# Patient Record
Sex: MALE | Race: WHITE
[De-identification: names, ages, dates, MRNs, and addresses within clinical notes are randomized per-mention and may not be internally consistent; named-entity substitution may affect disease eponyms.]

---

## 2017-01-20 ENCOUNTER — HOSPITAL ENCOUNTER (INPATIENT)
Dept: HOSPITAL 17 - PHED | Age: 77
LOS: 4 days | Discharge: HOME | DRG: 269 | End: 2017-01-24
Attending: SURGERY | Admitting: SURGERY
Payer: MEDICARE

## 2017-01-20 VITALS
DIASTOLIC BLOOD PRESSURE: 114 MMHG | OXYGEN SATURATION: 98 % | HEART RATE: 84 BPM | RESPIRATION RATE: 14 BRPM | SYSTOLIC BLOOD PRESSURE: 140 MMHG

## 2017-01-20 VITALS
SYSTOLIC BLOOD PRESSURE: 111 MMHG | OXYGEN SATURATION: 98 % | RESPIRATION RATE: 18 BRPM | HEART RATE: 75 BPM | DIASTOLIC BLOOD PRESSURE: 64 MMHG

## 2017-01-20 VITALS
TEMPERATURE: 97.8 F | RESPIRATION RATE: 18 BRPM | OXYGEN SATURATION: 96 % | SYSTOLIC BLOOD PRESSURE: 115 MMHG | DIASTOLIC BLOOD PRESSURE: 80 MMHG | HEART RATE: 66 BPM

## 2017-01-20 VITALS
HEART RATE: 66 BPM | DIASTOLIC BLOOD PRESSURE: 73 MMHG | TEMPERATURE: 98 F | RESPIRATION RATE: 18 BRPM | OXYGEN SATURATION: 94 % | SYSTOLIC BLOOD PRESSURE: 115 MMHG

## 2017-01-20 VITALS — HEIGHT: 68 IN | BODY MASS INDEX: 25.13 KG/M2 | WEIGHT: 165.79 LBS

## 2017-01-20 VITALS
DIASTOLIC BLOOD PRESSURE: 98 MMHG | OXYGEN SATURATION: 98 % | SYSTOLIC BLOOD PRESSURE: 183 MMHG | HEART RATE: 79 BPM | RESPIRATION RATE: 14 BRPM

## 2017-01-20 VITALS — TEMPERATURE: 98.4 F | RESPIRATION RATE: 16 BRPM | HEART RATE: 84 BPM | OXYGEN SATURATION: 99 %

## 2017-01-20 VITALS
RESPIRATION RATE: 16 BRPM | DIASTOLIC BLOOD PRESSURE: 71 MMHG | OXYGEN SATURATION: 100 % | SYSTOLIC BLOOD PRESSURE: 116 MMHG | HEART RATE: 64 BPM

## 2017-01-20 VITALS
RESPIRATION RATE: 18 BRPM | SYSTOLIC BLOOD PRESSURE: 111 MMHG | DIASTOLIC BLOOD PRESSURE: 66 MMHG | HEART RATE: 77 BPM | OXYGEN SATURATION: 98 %

## 2017-01-20 VITALS — RESPIRATION RATE: 16 BRPM | SYSTOLIC BLOOD PRESSURE: 147 MMHG | HEART RATE: 68 BPM | DIASTOLIC BLOOD PRESSURE: 81 MMHG

## 2017-01-20 VITALS
HEART RATE: 81 BPM | RESPIRATION RATE: 18 BRPM | SYSTOLIC BLOOD PRESSURE: 174 MMHG | OXYGEN SATURATION: 99 % | DIASTOLIC BLOOD PRESSURE: 90 MMHG

## 2017-01-20 VITALS — OXYGEN SATURATION: 97 % | RESPIRATION RATE: 16 BRPM

## 2017-01-20 DIAGNOSIS — I50.9: ICD-10-CM

## 2017-01-20 DIAGNOSIS — D72.829: ICD-10-CM

## 2017-01-20 DIAGNOSIS — F17.210: ICD-10-CM

## 2017-01-20 DIAGNOSIS — I71.4: Primary | ICD-10-CM

## 2017-01-20 DIAGNOSIS — R74.8: ICD-10-CM

## 2017-01-20 DIAGNOSIS — H91.90: ICD-10-CM

## 2017-01-20 DIAGNOSIS — I11.0: ICD-10-CM

## 2017-01-20 LAB
ALP SERPL-CCNC: 100 U/L (ref 45–117)
ALT SERPL-CCNC: 9 U/L (ref 12–78)
ANION GAP SERPL CALC-SCNC: 12 MEQ/L (ref 5–15)
APTT BLD: 33.7 SEC (ref 24.3–30.1)
APTT BLD: 35 SEC (ref 24.3–30.1)
AST SERPL-CCNC: 17 U/L (ref 15–37)
BASOPHILS # BLD AUTO: 0.3 TH/MM3 (ref 0–0.2)
BASOPHILS NFR BLD: 2.4 % (ref 0–2)
BILIRUB SERPL-MCNC: 1.5 MG/DL (ref 0.2–1)
BUN SERPL-MCNC: 14 MG/DL (ref 7–18)
CHLORIDE SERPL-SCNC: 98 MEQ/L (ref 98–107)
COLOR UR: YELLOW
COMMENT (UR): (no result)
CULTURE IF INDICATED: (no result)
EOSINOPHIL # BLD: 0 TH/MM3 (ref 0–0.4)
EOSINOPHIL NFR BLD: 0.1 % (ref 0–4)
ERYTHROCYTE [DISTWIDTH] IN BLOOD BY AUTOMATED COUNT: 13.3 % (ref 11.6–17.2)
ERYTHROCYTE [DISTWIDTH] IN BLOOD BY AUTOMATED COUNT: 13.5 % (ref 11.6–17.2)
GFR SERPLBLD BASED ON 1.73 SQ M-ARVRAT: 59 ML/MIN (ref 89–?)
GLUCOSE UR STRIP-MCNC: (no result) MG/DL
HCO3 BLD-SCNC: 22.8 MEQ/L (ref 21–32)
HCT VFR BLD CALC: 38.7 % (ref 39–51)
HCT VFR BLD CALC: 39.4 % (ref 39–51)
HEMO FLAGS: (no result)
HGB UR QL STRIP: (no result)
HYALINE CASTS #/AREA URNS LPF: (no result) /LPF
INR PPP: 1.1 RATIO
INR PPP: 1.1 RATIO
KETONES UR STRIP-MCNC: (no result) MG/DL
LEUKOCYTE ESTERASE UR QL STRIP: (no result) /HPF (ref 0–5)
LYMPHOCYTES # BLD AUTO: 1.8 TH/MM3 (ref 1–4.8)
LYMPHOCYTES NFR BLD AUTO: 12.4 % (ref 9–44)
MCH RBC QN AUTO: 29 PG (ref 27–34)
MCH RBC QN AUTO: 29.4 PG (ref 27–34)
MCHC RBC AUTO-ENTMCNC: 34 % (ref 32–36)
MCHC RBC AUTO-ENTMCNC: 34 % (ref 32–36)
MCV RBC AUTO: 85.4 FL (ref 80–100)
MCV RBC AUTO: 86.4 FL (ref 80–100)
METHOD OF COLLECTION: (no result)
MONOCYTES NFR BLD: 7.7 % (ref 0–8)
NEUTROPHILS # BLD AUTO: 11.4 TH/MM3 (ref 1.8–7.7)
NEUTROPHILS NFR BLD AUTO: 77.4 % (ref 16–70)
NITRITE UR QL STRIP: (no result)
PLATELET # BLD: 181 TH/MM3 (ref 150–450)
PLATELET # BLD: 194 TH/MM3 (ref 150–450)
POTASSIUM SERPL-SCNC: 4.5 MEQ/L (ref 3.5–5.1)
PROTHROMBIN TIME: 11.7 SEC (ref 9.8–11.6)
PROTHROMBIN TIME: 12.1 SEC (ref 9.8–11.6)
RBC # BLD AUTO: 4.48 MIL/MM3 (ref 4.5–5.9)
RBC # BLD AUTO: 4.61 MIL/MM3 (ref 4.5–5.9)
RBC #/AREA URNS HPF: (no result) /HPF (ref 0–3)
REVIEW FLAG: (no result)
SODIUM SERPL-SCNC: 133 MEQ/L (ref 136–145)
SP GR UR STRIP: 1.03 (ref 1–1.03)
SQUAMOUS #/AREA URNS HPF: (no result) /HPF (ref 0–5)
WBC # BLD AUTO: 13.5 TH/MM3 (ref 4–11)
WBC # BLD AUTO: 14.6 TH/MM3 (ref 4–11)

## 2017-01-20 PROCEDURE — 85610 PROTHROMBIN TIME: CPT

## 2017-01-20 PROCEDURE — 80048 BASIC METABOLIC PNL TOTAL CA: CPT

## 2017-01-20 PROCEDURE — 83605 ASSAY OF LACTIC ACID: CPT

## 2017-01-20 PROCEDURE — 96375 TX/PRO/DX INJ NEW DRUG ADDON: CPT

## 2017-01-20 PROCEDURE — 85018 HEMOGLOBIN: CPT

## 2017-01-20 PROCEDURE — 86920 COMPATIBILITY TEST SPIN: CPT

## 2017-01-20 PROCEDURE — 71010: CPT

## 2017-01-20 PROCEDURE — 86901 BLOOD TYPING SEROLOGIC RH(D): CPT

## 2017-01-20 PROCEDURE — C1769 GUIDE WIRE: HCPCS

## 2017-01-20 PROCEDURE — C9113 INJ PANTOPRAZOLE SODIUM, VIA: HCPCS

## 2017-01-20 PROCEDURE — 87641 MR-STAPH DNA AMP PROBE: CPT

## 2017-01-20 PROCEDURE — 74177 CT ABD & PELVIS W/CONTRAST: CPT

## 2017-01-20 PROCEDURE — 93308 TTE F-UP OR LMTD: CPT

## 2017-01-20 PROCEDURE — 93005 ELECTROCARDIOGRAM TRACING: CPT

## 2017-01-20 PROCEDURE — C1725 CATH, TRANSLUMIN NON-LASER: HCPCS

## 2017-01-20 PROCEDURE — 85730 THROMBOPLASTIN TIME PARTIAL: CPT

## 2017-01-20 PROCEDURE — 86900 BLOOD TYPING SEROLOGIC ABO: CPT

## 2017-01-20 PROCEDURE — 85025 COMPLETE CBC W/AUTO DIFF WBC: CPT

## 2017-01-20 PROCEDURE — 76937 US GUIDE VASCULAR ACCESS: CPT

## 2017-01-20 PROCEDURE — 74174 CTA ABD&PLVS W/CONTRAST: CPT

## 2017-01-20 PROCEDURE — 80053 COMPREHEN METABOLIC PANEL: CPT

## 2017-01-20 PROCEDURE — 96374 THER/PROPH/DIAG INJ IV PUSH: CPT

## 2017-01-20 PROCEDURE — 75630 X-RAY AORTA LEG ARTERIES: CPT

## 2017-01-20 PROCEDURE — 84100 ASSAY OF PHOSPHORUS: CPT

## 2017-01-20 PROCEDURE — 85027 COMPLETE CBC AUTOMATED: CPT

## 2017-01-20 PROCEDURE — 85014 HEMATOCRIT: CPT

## 2017-01-20 PROCEDURE — C1874 STENT, COATED/COV W/DEL SYS: HCPCS

## 2017-01-20 PROCEDURE — 83735 ASSAY OF MAGNESIUM: CPT

## 2017-01-20 PROCEDURE — 84484 ASSAY OF TROPONIN QUANT: CPT

## 2017-01-20 PROCEDURE — 83690 ASSAY OF LIPASE: CPT

## 2017-01-20 PROCEDURE — 94664 DEMO&/EVAL PT USE INHALER: CPT

## 2017-01-20 PROCEDURE — 83880 ASSAY OF NATRIURETIC PEPTIDE: CPT

## 2017-01-20 PROCEDURE — 81001 URINALYSIS AUTO W/SCOPE: CPT

## 2017-01-20 PROCEDURE — 86850 RBC ANTIBODY SCREEN: CPT

## 2017-01-20 RX ADMIN — MORPHINE SULFATE PRN MG: 2 INJECTION, SOLUTION INTRAMUSCULAR; INTRAVENOUS at 22:59

## 2017-01-20 RX ADMIN — ESMOLOL HYDROCHLORIDE SCH MLS/HR: 10 INJECTION INTRAVENOUS at 21:27

## 2017-01-20 RX ADMIN — ESMOLOL HYDROCHLORIDE SCH MLS/HR: 10 INJECTION INTRAVENOUS at 14:28

## 2017-01-20 NOTE — HHI.HP
History of Present Illness


Chief Complaint:


AAA


History of Present Illness


75 yo male with limited medical history who presented through ED with 2 days of 

abdominal pain and loose stools and emesis x 2.  Pt underwent CT scan that 

showed intact infrarenal AAA.  His pain is mostly in the LEFT hip and radiates 

to back and lower abdominal quadrants.  Never had this before.





Past/Family/Social History


Past Medical History


No known, but pt doesn't go to [facility] much.


Past Surgical History


appy


eye surgery


Social History


+ tobacco


Family History


brother with CAD


Home Medications


Reported Medications


Docusate Sodium (Dulcolax Stool Softener)100 Mg Sck418 Mg PO DAILY PRN (

CONSTIPATION) #60 CAP  Ref 0


   1/20/17


Coded Allergies:  


     No Known Allergies (Unverified , 1/20/17)





Review of Systems


Constitutional:  DENIES: Fever, Chills, Change in appetite


Respiratory:  COMPLAINS OF: Snoring, Wheezing, Hemoptysis, Sputum production,  

DENIES: Cough, Shortness of breath


Cardiovascular:  COMPLAINS OF: Syncope,  DENIES: Chest pain, Dyspnea on Exertion

, Claudication


Gastrointestinal:  COMPLAINS OF: Abdominal pain, Black stools, Diarrhea, Nausea

, Vomiting


Musculoskeletal:  COMPLAINS OF: Muscle aches, Joint Swelling, Back pain





Physical Exam


Vitals/I&O











  Date Time  Temp Pulse Resp B/P Pulse Ox O2 Delivery O2 Flow Rate FiO2


 


1/20/17 20:24  77 18  98   


 


1/20/17 20:23  77 18 111/66 98 Room Air  


 


1/20/17 19:30  75 18 111/64 98 Room Air  


 


1/20/17 18:30  64 16 116/71 100 Room Air  


 


1/20/17 16:08  68 16 147/81    


 


1/20/17 15:05  84 14 140/114 98 Room Air  


 


1/20/17 14:16  79 14 183/98 98 Room Air  


 


1/20/17 13:00  81 18 174/90 99 Room Air  


 


1/20/17 12:30   16  97 Room Air  


 


1/20/17 12:28   16     


 


1/20/17 11:03 98.4 84 16  99   














 1/20/17 1/20/17 1/20/17





 07:00 15:00 23:00


 


Output Total   900 ml


 


Balance   -900 ml








Neuro:


alert oriented


HEENT:


NC/AT


Neck:


trachea midline, no JVD


Heart:


reg rate, rhythm


Lungs:


distant BS bilaterally


Abdomen:


soft, palpable upper abdominal mass, no peritonitis.


Lower quadrants mildly TTP equally


Vascular:


palpable femoral, popliteal pulses


Extremities:


5/5 strength





Laboratory Tests








Test 1/20/17 1/20/17 1/20/17 1/20/17





 11:41 11:57 13:39 14:40


 


White Blood Count 14.6   13.5  


 


Red Blood Count 4.61   4.48  


 


Hemoglobin 13.4   13.2  


 


Hematocrit 39.4   38.7  


 


Mean Corpuscular Volume 85.4   86.4  


 


Mean Corpuscular Hemoglobin 29.0   29.4  


 


Mean Corpuscular Hemoglobin 34.0   34.0  





Concent    


 


Red Cell Distribution Width 13.5   13.3  


 


Platelet Count 194   181  


 


Mean Platelet Volume 6.7   6.2  


 


Neutrophils (%) (Auto) 77.4    


 


Lymphocytes (%) (Auto) 12.4    


 


Monocytes (%) (Auto) 7.7    


 


Eosinophils (%) (Auto) 0.1    


 


Basophils (%) (Auto) 2.4    


 


Neutrophils # (Auto) 11.4    


 


Lymphocytes # (Auto) 1.8    


 


Monocytes # (Auto) 1.1    


 


Eosinophils # (Auto) 0.0    


 


Basophils # (Auto) 0.3    


 


CBC Comment DIFF FINAL    


 


Differential Comment     


 


Prothrombin Time 11.7   12.1  


 


Prothromb Time International 1.1   1.1  





Ratio    


 


Activated Partial 33.7   35.0  





Thromboplast Time    


 


Sodium Level 133    


 


Potassium Level 4.5    


 


Chloride Level 98    


 


Carbon Dioxide Level 22.8    


 


Anion Gap 12    


 


Blood Urea Nitrogen 14    


 


Creatinine 1.20    


 


Estimat Glomerular Filtration 59    





Rate    


 


Random Glucose 115    


 


Lactic Acid Level 2.3    


 


Calcium Level 9.3    


 


Total Bilirubin 1.5    


 


Aspartate Amino Transf 17    





(AST/SGOT)    


 


Alanine Aminotransferase 9    





(ALT/SGPT)    


 


Alkaline Phosphatase 100    


 


Troponin I 0.32    


 


B-Type Natriuretic Peptide 1172    


 


Total Protein 7.4    


 


Albumin 2.3    


 


Lipase 66    


 


Urine Collection Type  CLEAN CATCH   


 


Urine Color  YELLOW   


 


Urine Turbidity  SLIGHT   


 


Urine pH  6.5   


 


Urine Specific Gravity  1.035   


 


Urine Protein  300 OR GREATER   


 


Urine Glucose (UA)  NEG   


 


Urine Ketones  NEG   


 


Urine Occult Blood  LARGE   


 


Urine Nitrite  NEG   


 


Urine Bilirubin  NEG   


 


Urine Leukocyte Esterase  NEG   


 


Urine RBC  0-3   


 


Urine WBC  0-2   


 


Urine Squamous Epithelial  0-5   





Cells    


 


Urine Amorphous Sediment  FEW   


 


Urine Hyaline Casts  15-19   


 


Microscopic Urinalysis Comment  CULT NOT  





  INDICATED  


 


Urine Collection Time  11:57   


 


Blood Type    O POSITIVE 


 


Antibody Screen    NEGATIVE 











Last 48 hours Impressions








Abdomen/Pelvis CT 1/20/17 1127 Signed





Impressions: 





 Service Date/Time:  Friday, January 20, 2017 13:46 - CONCLUSION:  Abdominal 





 aortic aneurysm as described above.    There is no evidence for leakage.   





 Findings have been discussed with Dr. Rojas on today's date.     Janes Saab MD  FACR


 


Chest X-Ray 1/20/17 0000 Signed





Impressions: 





 Service Date/Time:  Friday, January 20, 2017 11:34 - CONCLUSION:  1. 





 Cardiomegaly and findings of congestive heart failure.     Yash Weaver MD 











Assessment and Plan


Plan


1. AAA, 6 cm and intact.  Possibly symptomatic.  I would like to repeat his CTA 

tomorrow morning after IV hydration/bicarbonate.  Likely to have repair this 

hospitalization.  I discussed the procedure with the patient and he seems to 

understand the nature of the benefits of surgery vs observation.   There is no 

evidence of extravasation and he looks well clinically.


2. CV protective meds: ASA, beta blocker, statin. 


3. Goal SBP < 130


4. The patient previously expressed a wish to be DNR but he does not want that 

at present and agrees to pre-operative planning.








Saqib Hercules MD Jan 20, 2017 22:46

## 2017-01-20 NOTE — HHI.CCPN
History


-


Height: 172.72 cm     Weight: 74 kg


Allergies:  


Coded Allergies:  


     No Known Allergies (Unverified , 1/20/17)


Major 24 Hour Events


Notified by ED/Dr. Rojas that patient being transferred from [place] for 

stable AAA.  No intervention planned at this time per ED physician.  Dr. Hercules 

is the admitting physician.  We will be available.





Exam


Patient Data


-





 Vital Signs








  Date Time  Temp Pulse Resp B/P Pulse Ox O2 Delivery O2 Flow Rate FiO2


 


1/20/17 16:08  68 16 147/81    


 


1/20/17 15:05  84 14 140/114 98 Room Air  


 


1/20/17 14:16  79 14 183/98 98 Room Air  


 


1/20/17 13:00  81 18 174/90 99 Room Air  


 


1/20/17 12:30   16  97 Room Air  


 


1/20/17 12:28   16     


 


1/20/17 11:03 98.4 84 16  99   











Results


CBC/BMP:  


1/20/17 1339                                                                   

             1/20/17 1141











Pastor Ferreira MD Jan 20, 2017 16:44

## 2017-01-20 NOTE — RADHPO
EXAM DATE/TIME:  01/20/2017 11:34 

 

[facility] COMPARISON:     

No previous studies available for comparison.

 

                     

INDICATIONS :     

Short of breath

                     

 

MEDICAL HISTORY :            

Smoker   

 

SURGICAL HISTORY :     

None.   

 

ENCOUNTER:     

Initial                                        

 

ACUITY:     

2 days      

 

PAIN SCORE:     

5/10

 

LOCATION:     

Bilateral chest 

 

FINDINGS:     

The cardiac silhouette is enlarged in transverse diameter. There are findings of congestive heart kinsey
lure with interstitial and alveolar opacity bilaterally. No pleural effusions are identified. There i
s prominence of the aortic knob is with calcification characteristic of atherosclerotic vascular dise
ase.

 

CONCLUSION:     

1. Cardiomegaly and findings of congestive heart failure.

 

 

 

 Yash Weaver MD on January 20, 2017 at 11:48           

Board Certified Radiologist.

 This report was verified electronically.

## 2017-01-20 NOTE — RADHPO
EXAM DATE/TIME:  01/20/2017 13:46 

 

[facility] COMPARISON:     

No previous studies available for comparison.

 

 

INDICATIONS:     

Left lower quadrant pain.  Bilateral flank pain.  Hematuria. 

                      

 

IV CONTRAST:     

80 cc Omnipaque 350 (iohexol) IV 

 

 

ORAL CONTRAST:      

No oral contrast ingested.

                      

 

RADIATION DOSE:     

7.16 CTDIvol (mGy) 

 

 

MEDICAL HISTORY:      

None  

 

SURGICAL HISTORY:       

Appendectomy. 

 

ENCOUNTER:      

Initial

 

ACUITY:      

2 days

 

PAIN SCALE:      

8/10

 

LOCATION:       

Left lower quadrant 

 

TECHNIQUE:     

Volumetric scanning of the abdomen and pelvis was performed.  Using automated exposure control and ad
justment of the mA and/or kV according to patient size, radiation dose was kept as low as reasonably 
achievable to obtain optimal diagnostic quality images. 

 

FINDINGS:     

Coarse interstitial changes seen in both lung bases.  

 

The liver, spleen, pancreas are unremarkable.  Adrenal glands appear normal.  There is symmetrical re
nal 

function. 

 

There is a 6.3 cm infrarenal abdominal aortic aneurysm with two separate layers of calcification in t
he wall 

suggesting previous expansion.  This extends down to the aortic bifurcation.  Both iliac arteries are
 patent.  

 

There is no retroperitoneal hematoma.  

 

There is no ascites or adenopathy appreciated. 

 

CONCLUSION:     

Abdominal aortic aneurysm as described above.  

 

There is no evidence for leakage. 

 

Findings have been discussed with Dr. Rojas on today's date. 

 

 

 Janes Saab MD FACR on January 20, 2017 at 14:14                

Board Certified Radiologist.

 This report was verified electronically.

## 2017-01-20 NOTE — PD
HPI


Chief Complaint:  GI Complaint


Time Seen by Provider:  11:16


Travel History


International Travel<30 days:  No


Contact w/Intl Traveler<30days:  No


Traveled to known affect area:  No





History of Present Illness


HPI


75yo M who denies any PMH presents to the ED with c/o abdominal pain for 2.5 

days.  States it is more left sided from back to left lower abdomen and down 

proximal femur of left leg.  Pt also states he started having right sided pain 

after.  Pt has sob with walking only a few steps.  +Nausea.  Normal bowel 

movement yesterday.  Denies any chest pain, fever, vomiting, urinary complaints

, weakness or numbness.





PFSH


Past Medical History


Diminished Hearing:  Yes


Immunizations Current:  Yes


Tetanus Vaccination:  Unknown


Influenza Vaccination:  No





Past Surgical History


Appendectomy:  Yes





Social History


Alcohol Use:  No


Tobacco Use:  Yes (1 PPD)


Substance Use:  No





Allergies-Medications


(Allergen,Severity, Reaction):  


Coded Allergies:  


     No Known Allergies (Unverified , 1/20/17)


Reported Meds & Prescriptions





Reported Meds & Active Scripts


Active


Reported


Dulcolax Stool Softener (Docusate Sodium) 100 Mg Cap 100 Mg PO DAILY PRN








Review of Systems


Except as stated in HPI:  all other systems reviewed are Neg





Physical Exam


Narrative


GENERAL: 75yo M in mild distress.


SKIN: Warm and dry.


HEAD: Atraumatic. Normocephalic. 


EYES: Pupils equal and round. No scleral icterus. No injection or drainage. 


ENT: No nasal bleeding or discharge.  Mucous membranes pink and moist.


NECK: Trachea midline. No JVD. 


CARDIOVASCULAR: Regular rate and rhythm.  No murmur appreciated.


RESPIRATORY: + accessory muscle use. Crackles bilateral lower lungs.  


GASTROINTESTINAL: Abdomen soft, mild epigastric ttp.  +TTP LLQ.  No rebound 

tenderness or guarding.


MUSCULOSKELETAL: No obvious deformities. No clubbing.  No cyanosis.  Trace 

lower ext edema. 


NEUROLOGICAL: Awake and alert. No obvious cranial nerve deficits.  Motor 

grossly within normal limits. Normal speech.


PSYCHIATRIC: Appropriate mood and affect; insight and judgment normal.





Data


Data


Last Documented VS





Vital Signs








  Date Time  Temp Pulse Resp B/P Pulse Ox O2 Delivery O2 Flow Rate FiO2


 


1/20/17 14:16  79 14 183/98 98 Room Air  


 


1/20/17 11:03 98.4       








Orders





 Complete Blood Count With Diff (1/20/17 11:27)


Comprehensive Metabolic Panel (1/20/17 11:27)


Lipase (1/20/17 11:27)


Lactic Acid (1/20/17 11:27)


Prothrombin Time / Inr (Pt) (1/20/17 11:27)


Act Partial Throm Time (Ptt) (1/20/17 11:27)


Urinalysis - C+S If Indicated (1/20/17 11:27)


Ct Abd/Pel W Iv Contrast(Rout) (1/20/17 11:27)


Iv Access Insert/Monitor (1/20/17 11:27)


Ecg Monitoring (1/20/17 11:27)


Oximetry (1/20/17 11:27)


Morphine Inj (Morphine Inj) (1/20/17 11:30)


Ondansetron Inj (Zofran Inj) (1/20/17 11:30)


Pantoprazole Inj (Protonix Inj) (1/20/17 11:30)


Sodium Chloride 0.9% Flush (Ns Flush) (1/20/17 11:30)


Electrocardiogram (1/20/17 11:27)


Troponin I (1/20/17 11:29)


Chest, Single Ap (1/20/17 )


B-Type Natriuretic Peptide (1/20/17 11:29)


Furosemide Inj (Lasix Inj) (1/20/17 13:15)


Heparin Infusion KELLEY.Q1H (1/20/17 13:15)


Heparin Inj (Heparin Inj) (1/20/17 19:15)


Heparin Inj (Heparin Inj) (1/20/17 19:15)


Heparin-D5w Inj (Heparin-D5w Inj) (1/20/17 13:15)


Act Partial Throm Time (Ptt) (1/20/17 13:15)


Prothrombin Time / Inr (Pt) (1/20/17 13:15)


Cbc No Diff, Includes Plts (1/20/17 13:15)


Occult Blood (Hemoccult) Stool (1/20/17 13:15)


Iohexol 350 Inj (Omnipaque 350 Inj) (1/20/17 13:56)


Esmolol Drip Inj Premix (Brevibloc Drip (1/20/17 14:15)


Admit Order (Ed Use Only) (1/20/17 14:20)





Labs





 Laboratory Tests








Test 1/20/17 1/20/17 1/20/17





 11:41 11:57 13:39


 


White Blood Count 14.6 TH/MM3  13.5 TH/MM3


 


Red Blood Count 4.61 MIL/MM3  4.48 MIL/MM3


 


Hemoglobin 13.4 GM/DL  13.2 GM/DL


 


Hematocrit 39.4 %  38.7 %


 


Mean Corpuscular Volume 85.4 FL  86.4 FL


 


Mean Corpuscular Hemoglobin 29.0 PG  29.4 PG


 


Mean Corpuscular Hemoglobin 34.0 %  34.0 %





Concent   


 


Red Cell Distribution Width 13.5 %  13.3 %


 


Platelet Count 194 TH/MM3  181 TH/MM3


 


Mean Platelet Volume 6.7 FL  6.2 FL


 


Neutrophils (%) (Auto) 77.4 %  


 


Lymphocytes (%) (Auto) 12.4 %  


 


Monocytes (%) (Auto) 7.7 %  


 


Eosinophils (%) (Auto) 0.1 %  


 


Basophils (%) (Auto) 2.4 %  


 


Neutrophils # (Auto) 11.4 TH/MM3  


 


Lymphocytes # (Auto) 1.8 TH/MM3  


 


Monocytes # (Auto) 1.1 TH/MM3  


 


Eosinophils # (Auto) 0.0 TH/MM3  


 


Basophils # (Auto) 0.3 TH/MM3  


 


CBC Comment DIFF FINAL   


 


Differential Comment    


 


Prothrombin Time 11.7 SEC  12.1 SEC


 


Prothromb Time International 1.1 RATIO  1.1 RATIO





Ratio   


 


Activated Partial 33.7 SEC  35.0 SEC





Thromboplast Time   


 


Sodium Level 133 MEQ/L  


 


Potassium Level 4.5 MEQ/L  


 


Chloride Level 98 MEQ/L  


 


Carbon Dioxide Level 22.8 MEQ/L  


 


Anion Gap 12 MEQ/L  


 


Blood Urea Nitrogen 14 MG/DL  


 


Creatinine 1.20 MG/DL  


 


Estimat Glomerular Filtration 59 ML/MIN  





Rate   


 


Random Glucose 115 MG/DL  


 


Lactic Acid Level 2.3 mmol/L  


 


Calcium Level 9.3 MG/DL  


 


Total Bilirubin 1.5 MG/DL  


 


Aspartate Amino Transf 17 U/L  





(AST/SGOT)   


 


Alanine Aminotransferase 9 U/L  





(ALT/SGPT)   


 


Alkaline Phosphatase 100 U/L  


 


Troponin I 0.32 NG/ML  


 


B-Type Natriuretic Peptide 1172 PG/ML  


 


Total Protein 7.4 GM/DL  


 


Albumin 2.3 GM/DL  


 


Lipase 66 U/L  


 


Urine Collection Type  CLEAN CATCH  


 


Urine Color  YELLOW  


 


Urine Turbidity  SLIGHT  


 


Urine pH  6.5  


 


Urine Specific Gravity  1.035  


 


Urine Protein  300 OR GREATER 





  mg/dL 


 


Urine Glucose (UA)  NEG mg/dL 


 


Urine Ketones  NEG mg/dL 


 


Urine Occult Blood  LARGE  


 


Urine Nitrite  NEG  


 


Urine Bilirubin  NEG  


 


Urine Leukocyte Esterase  NEG  


 


Urine RBC  0-3 /hpf 


 


Urine WBC  0-2 /hpf 


 


Urine Squamous Epithelial  0-5 /hpf 





Cells   


 


Urine Amorphous Sediment  FEW  


 


Urine Hyaline Casts  15-19 /lpf 


 


Microscopic Urinalysis Comment  CULT NOT 





  INDICATED 


 


Urine Collection Time  11:57  











[facility]


Medical Decision Making


Medical Screen Exam Complete:  Yes


Emergency Medical Condition:  Yes


Interpretation(s)


EKG: NSR 75bpm.  Normal axis.  TWI I, aVL, V4-V6.


Differential Diagnosis


Diverticulitis vs. nephrolithiasis vs. colitis vs. pancreatitis vs. PNA


Narrative Course


75yo M with abdominal pain for 2 days.  Pt also with sob with ambulation that 

is new today.  Labs reviewed, mild leukocytosis at 14.6.  H/H stable.  Lactic 

acid mildly increased at 2.3.  Given pt's CHF, did not want to give IVF.  Will 

empirically treat with antibiotics.  BNP 1172.  Troponin elevated at 0.32.  UA 

negative.  Abdominal pain improved with pantoprazole and morphine 2mg IV.  

Discussed with Dr. Carmona and agreed on diuresis with lasix.  Lasix 40mg IV 

given.  Heparin was initially ordered for NSTEMI but not given after seeing the 

aneurysm on CTa/p.  Pt's abdominal pain returned when he returned from CTa/p.  

BP is 178/92.  I ordered an esmolol drip stat.  Also ordered morphine for pain.

  Discussed with vascular surgeon Dr. Hercules who recommends keeping systolic in 

the 110-120 range.  Also discussed with Dr. Saab radiologist who states that 

there is a 6cm aneurysm with recent expansion but no dissection or leaks.  Pt 

reevaluated at bedside and abdominal pain is again gone.  BP is improving on 

esmolol drip to 147/81, informed nurse to titrate with goal of systolic 110-

120.  Discussed with intensivist that pt will be admitted to ICU.  Pt will be 

transferred to the [facility] and is admitted to vascular surgeon Dr. Hercules.

  Pt is currently hemodynamically stable for transfer.


Critical Care Narrative


Aggregate critical care time was 60 minutes. Time to perform other separately 

billable procedures was not  


included in the critical care time. My time did not include minutes spent 

treating any other patients simultaneously or on  


activities that did not directly contribute to the patient's treatment.  





The services I provided to this patient were to treat and/or prevent clinically 

significant deterioration that could result  


in:  cardiovascular collapse or death.





I provided critical care services requiring my management, as noted below:


Chart data review, documentation time, medication orders and management, vital 

sign assessments/reviewing monitor data,  


ordering and reviewing lab tests, ordering and interpreting/reviewing x-rays 

and diagnostic studies, care of the patient  


and discussion of the patient with the admitting physicians.





Diagnosis





 Primary Impression:  


 AAA (abdominal aortic aneurysm) without rupture





Admitting Information


Admitting Physician Requests:  Admit








Yue Rojas DO Jan 20, 2017 11:43

## 2017-01-21 VITALS
SYSTOLIC BLOOD PRESSURE: 116 MMHG | RESPIRATION RATE: 16 BRPM | TEMPERATURE: 97.9 F | DIASTOLIC BLOOD PRESSURE: 66 MMHG | OXYGEN SATURATION: 96 % | HEART RATE: 82 BPM

## 2017-01-21 VITALS — OXYGEN SATURATION: 99 %

## 2017-01-21 VITALS
DIASTOLIC BLOOD PRESSURE: 70 MMHG | TEMPERATURE: 98 F | RESPIRATION RATE: 18 BRPM | SYSTOLIC BLOOD PRESSURE: 115 MMHG | HEART RATE: 76 BPM | OXYGEN SATURATION: 95 %

## 2017-01-21 VITALS
RESPIRATION RATE: 18 BRPM | DIASTOLIC BLOOD PRESSURE: 63 MMHG | HEART RATE: 64 BPM | SYSTOLIC BLOOD PRESSURE: 106 MMHG | TEMPERATURE: 97.9 F | OXYGEN SATURATION: 94 %

## 2017-01-21 VITALS
RESPIRATION RATE: 18 BRPM | OXYGEN SATURATION: 95 % | DIASTOLIC BLOOD PRESSURE: 77 MMHG | HEART RATE: 76 BPM | SYSTOLIC BLOOD PRESSURE: 129 MMHG | TEMPERATURE: 98.2 F

## 2017-01-21 VITALS
DIASTOLIC BLOOD PRESSURE: 67 MMHG | OXYGEN SATURATION: 96 % | SYSTOLIC BLOOD PRESSURE: 118 MMHG | RESPIRATION RATE: 18 BRPM | HEART RATE: 76 BPM | TEMPERATURE: 98.1 F

## 2017-01-21 VITALS
DIASTOLIC BLOOD PRESSURE: 63 MMHG | TEMPERATURE: 97.6 F | SYSTOLIC BLOOD PRESSURE: 108 MMHG | HEART RATE: 79 BPM | RESPIRATION RATE: 18 BRPM | OXYGEN SATURATION: 99 %

## 2017-01-21 VITALS
RESPIRATION RATE: 18 BRPM | SYSTOLIC BLOOD PRESSURE: 127 MMHG | TEMPERATURE: 98 F | HEART RATE: 78 BPM | DIASTOLIC BLOOD PRESSURE: 77 MMHG | OXYGEN SATURATION: 96 %

## 2017-01-21 VITALS — OXYGEN SATURATION: 97 %

## 2017-01-21 VITALS — HEART RATE: 70 BPM

## 2017-01-21 VITALS — HEART RATE: 64 BPM

## 2017-01-21 LAB
ALP SERPL-CCNC: 86 U/L (ref 45–117)
ALT SERPL-CCNC: 9 U/L (ref 12–78)
ANION GAP SERPL CALC-SCNC: 10 MEQ/L (ref 5–15)
AST SERPL-CCNC: 22 U/L (ref 15–37)
BILIRUB SERPL-MCNC: 1.3 MG/DL (ref 0.2–1)
BUN SERPL-MCNC: 20 MG/DL (ref 7–18)
CHLORIDE SERPL-SCNC: 99 MEQ/L (ref 98–107)
GFR SERPLBLD BASED ON 1.73 SQ M-ARVRAT: 63 ML/MIN (ref 89–?)
HCO3 BLD-SCNC: 22.9 MEQ/L (ref 21–32)
MAGNESIUM SERPL-MCNC: 2 MG/DL (ref 1.5–2.5)
POTASSIUM SERPL-SCNC: 4 MEQ/L (ref 3.5–5.1)
SODIUM SERPL-SCNC: 132 MEQ/L (ref 136–145)

## 2017-01-21 RX ADMIN — ASPIRIN SCH MG: 325 TABLET, DELAYED RELEASE ORAL at 08:36

## 2017-01-21 RX ADMIN — HEPARIN SODIUM SCH UNITS: 10000 INJECTION, SOLUTION INTRAVENOUS; SUBCUTANEOUS at 08:35

## 2017-01-21 RX ADMIN — ESMOLOL HYDROCHLORIDE SCH MLS/HR: 10 INJECTION INTRAVENOUS at 12:59

## 2017-01-21 RX ADMIN — ESMOLOL HYDROCHLORIDE SCH MLS/HR: 10 INJECTION INTRAVENOUS at 00:19

## 2017-01-21 RX ADMIN — PANTOPRAZOLE SCH MG: 40 TABLET, DELAYED RELEASE ORAL at 08:35

## 2017-01-21 RX ADMIN — ESMOLOL HYDROCHLORIDE SCH MLS/HR: 10 INJECTION INTRAVENOUS at 04:16

## 2017-01-21 RX ADMIN — PANTOPRAZOLE SCH MG: 40 TABLET, DELAYED RELEASE ORAL at 20:48

## 2017-01-21 RX ADMIN — MORPHINE SULFATE PRN MG: 2 INJECTION, SOLUTION INTRAMUSCULAR; INTRAVENOUS at 14:43

## 2017-01-21 RX ADMIN — SODIUM CHLORIDE, PRESERVATIVE FREE SCH ML: 5 INJECTION INTRAVENOUS at 20:49

## 2017-01-21 RX ADMIN — HEPARIN SODIUM SCH UNITS: 10000 INJECTION, SOLUTION INTRAVENOUS; SUBCUTANEOUS at 20:48

## 2017-01-21 RX ADMIN — SODIUM CHLORIDE, PRESERVATIVE FREE SCH ML: 5 INJECTION INTRAVENOUS at 08:36

## 2017-01-21 RX ADMIN — ATORVASTATIN CALCIUM SCH MG: 20 TABLET, FILM COATED ORAL at 20:48

## 2017-01-21 RX ADMIN — LABETALOL HYDROCHLORIDE PRN MG: 5 INJECTION, SOLUTION INTRAVENOUS at 07:15

## 2017-01-21 NOTE — RADRPT
EXAM DATE/TIME:  01/21/2017 10:59 

 

[facility] COMPARISON:     

CT ABDOMEN & PELVIS W CONTRAST, January 20, 2017, 13:46.

 

 

INDICATIONS :     

Evaluate for AAA; surgical planning.

                      

 

IV CONTRAST:     

75 cc Omnipaque 350 (iohexol) IV 

 

 

ORAL CONTRAST:      

No oral contrast ingested.

                      

 

RADIATION DOSE:     

11.23 CTDIvol (mGy) 

 

 

MEDICAL HISTORY :     

None  

 

SURGICAL HISTORY :      

Appendectomy. 

 

ENCOUNTER:      

Initial

 

ACUITY:      

1 day

 

PAIN SCALE:      

5/10

 

LOCATION:       

Bilateral  abdomen.

 

TECHNIQUE:     

Volumetric scanning was performed using a multi-row detector CT scanner.  The data was post processed
 with a variety of visualization algorithms including full volume maximum intensity projection, multi
-planar sliding thin slab reformation, curved planar reformation, and surface rendering techniques.  
Using automated exposure control and adjustment of the mA and/or kV according to patient size, radiat
ion dose was kept as low as reasonably achievable to obtain optimal diagnostic quality images. 

 

FINDINGS:     

 

ABDOMINAL AORTA:     

There is severe atherosclerotic disease of the abdominal aorta. Celiac trunk and superior mesenteric 
artery demonstrate no osseous stenosis or distal stenosis. There are patent single renal arteries karolina
aterally. Noncalcified plaque is present the ostia of the main renal arteries bilaterally, left great
er than right but less than 50% narrowing is appreciated. The infrarenal abdominal aorta is ectatic p
roximally and there is a tortuous fusiform aneurysm of the distal abdominal aorta measuring up to 5.7
 x 6.1 cm in AP and transverse dimensions, respectively. The aneurysm measures approximately 9 cm in 
length and extends to the bifurcation. The aneurysm begins approximately 5 cm inferior to the renal a
rteries. There is induration of the fat adjacent to the aortic aneurysm. A few mildly enlarged adjace
nt lymph nodes are present. There is trace fluid in the retroperitoneum.

 

RIGHT PELVIS:     

The right common iliac artery demonstrates severe atherosclerotic disease but no aneurysm. Both the i
nternal and external iliac arteries contain severe atherosclerotic disease but no high-grade stenosis
 or aneurysm.

 

LEFT PELVIS:     

There is a left common iliac artery aneurysm measuring up to 18 mm. There is severe atherosclerotic d
isease of the iliac arterial vessels. No high-grade stenosis is appreciated.

 

OTHER:     

The liver, adrenal glands, kidneys, spleen, and pancreas demonstrate no significant abnormality. Ther
e is vicarious excretion of contrast material into the gallbladder. At the lung bases there are kusum
l subpleural interstitial opacities with honeycombing. The bowel and urinary bladder demonstrate no a
bnormality. There are degenerative changes of the lumbar spine.

 

CONCLUSION:     

1. Severe atherosclerotic disease with fusiform infrarenal abdominal aortic aneurysm measuring up to 
6.1 x 5.7 cm. Additional details are given above. Additionally, there is induration/inflammation of t
he fat adjacent to the aneurysm particularly on the left side. There is also a small amount of retrop
eritoneal fluid in the left abdomen and pelvis. These findings may indicate inflammation.

2. 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

3. Abnormal interstitial lung disease at the visualized lung bases suggestive of idiopathic pulmonary
 fibrosis or usual interstitial pneumonitis (UIP).

 

 

 

 Chilo Sanabria MD on January 21, 2017 at 15:13           

Board Certified Radiologist.

 This report was verified electronically.

## 2017-01-21 NOTE — PD.VS.PN
Subjective


Subjective/Hospital Course


Pt rested well last night, no more abdominal pain, just mild global discomfort 

and he states he is "bored"





Objective


Vitals/I&O











  Date Time  Temp Pulse Resp B/P Pulse Ox O2 Delivery O2 Flow Rate FiO2


 


1/21/17 08:00 98.1 76 18 118/67 96   


 


1/21/17 07:00  64      


 


1/21/17 04:00  76      


 


1/21/17 04:00 98.0 78 18 127/77 96   


 


1/21/17 00:00  64      


 


1/21/17 00:00 97.9 64 18 106/63 94   


 


1/20/17 23:00 98.0 66 18 115/73 94   


 


1/20/17 21:00  66      


 


1/20/17 21:00 97.8 66 18 115/80 96   


 


1/20/17 20:24  77 18  98   


 


1/20/17 20:23  77 18 111/66 98 Room Air  


 


1/20/17 19:30  75 18 111/64 98 Room Air  


 


1/20/17 18:30  64 16 116/71 100 Room Air  


 


1/20/17 16:08  68 16 147/81    


 


1/20/17 15:05  84 14 140/114 98 Room Air  


 


1/20/17 14:16  79 14 183/98 98 Room Air  


 


1/20/17 13:00  81 18 174/90 99 Room Air  


 


1/20/17 12:30   16  97 Room Air  


 


1/20/17 12:28   16     


 


1/20/17 11:03 98.4 84 16  99   








Physical Exam


Resting comfortably


Minimal LQ abdominal pain, no rebound


Palpable femoral pulses


Laboratory





Laboratory Tests








Test 1/20/17 1/20/17 1/20/17 1/20/17





 11:41 11:57 13:39 14:40


 


White Blood Count 14.6   13.5  


 


Red Blood Count 4.61   4.48  


 


Hemoglobin 13.4   13.2  


 


Hematocrit 39.4   38.7  


 


Mean Corpuscular Volume 85.4   86.4  


 


Mean Corpuscular Hemoglobin 29.0   29.4  


 


Mean Corpuscular Hemoglobin 34.0   34.0  





Concent    


 


Red Cell Distribution Width 13.5   13.3  


 


Platelet Count 194   181  


 


Mean Platelet Volume 6.7   6.2  


 


Neutrophils (%) (Auto) 77.4    


 


Lymphocytes (%) (Auto) 12.4    


 


Monocytes (%) (Auto) 7.7    


 


Eosinophils (%) (Auto) 0.1    


 


Basophils (%) (Auto) 2.4    


 


Neutrophils # (Auto) 11.4    


 


Lymphocytes # (Auto) 1.8    


 


Monocytes # (Auto) 1.1    


 


Eosinophils # (Auto) 0.0    


 


Basophils # (Auto) 0.3    


 


CBC Comment DIFF FINAL    


 


Differential Comment     


 


Prothrombin Time 11.7   12.1  


 


Prothromb Time International 1.1   1.1  





Ratio    


 


Activated Partial 33.7   35.0  





Thromboplast Time    


 


Sodium Level 133    


 


Potassium Level 4.5    


 


Chloride Level 98    


 


Carbon Dioxide Level 22.8    


 


Anion Gap 12    


 


Blood Urea Nitrogen 14    


 


Creatinine 1.20    


 


Estimat Glomerular Filtration 59    





Rate    


 


Random Glucose 115    


 


Lactic Acid Level 2.3    


 


Calcium Level 9.3    


 


Total Bilirubin 1.5    


 


Aspartate Amino Transf 17    





(AST/SGOT)    


 


Alanine Aminotransferase 9    





(ALT/SGPT)    


 


Alkaline Phosphatase 100    


 


Troponin I 0.32    


 


B-Type Natriuretic Peptide 1172    


 


Total Protein 7.4    


 


Albumin 2.3    


 


Lipase 66    


 


Urine Collection Type  CLEAN CATCH   


 


Urine Color  YELLOW   


 


Urine Turbidity  SLIGHT   


 


Urine pH  6.5   


 


Urine Specific Gravity  1.035   


 


Urine Protein  300 OR GREATER   


 


Urine Glucose (UA)  NEG   


 


Urine Ketones  NEG   


 


Urine Occult Blood  LARGE   


 


Urine Nitrite  NEG   


 


Urine Bilirubin  NEG   


 


Urine Leukocyte Esterase  NEG   


 


Urine RBC  0-3   


 


Urine WBC  0-2   


 


Urine Squamous Epithelial  0-5   





Cells    


 


Urine Amorphous Sediment  FEW   


 


Urine Hyaline Casts  15-19   


 


Microscopic Urinalysis Comment  CULT NOT  





  INDICATED  


 


Urine Collection Time  11:57   


 


Blood Type    O POSITIVE 


 


Antibody Screen    NEGATIVE 


 


    





Test 1/21/17   





 04:50   


 


Sodium Level 132    


 


Potassium Level 4.0    


 


Chloride Level 99    


 


Carbon Dioxide Level 22.9    


 


Anion Gap 10    


 


Blood Urea Nitrogen 20    


 


Creatinine 1.13    


 


Estimat Glomerular Filtration 63    





Rate    


 


Random Glucose 81    


 


Calcium Level 8.6    


 


Magnesium Level 2.0    


 


Total Bilirubin 1.3    


 


Aspartate Amino Transf 22    





(AST/SGOT)    


 


Alanine Aminotransferase 9    





(ALT/SGPT)    


 


Alkaline Phosphatase 86    


 


Troponin I 0.20    


 


Total Protein 6.3    


 


Albumin 2.0    








Imaging





Last 48 hours Impressions








Abdomen/Pelvis CT 1/20/17 1127 Signed





Impressions: 





 Service Date/Time:  Friday, January 20, 2017 13:46 - CONCLUSION:  Abdominal 





 aortic aneurysm as described above.    There is no evidence for leakage.   





 Findings have been discussed with Dr. Rojas on today's date.     Janes Saab MD  FACR


 


Chest X-Ray 1/20/17 0000 Signed





Impressions: 





 Service Date/Time:  Friday, January 20, 2017 11:34 - CONCLUSION:  1. 





 Cardiomegaly and findings of congestive heart failure.     Yash Weaver MD 











Assessment and Plan


Plan


1. AAA, 6 cm and intact.  Plan for EVAR Monday morning.


2. Continue bp control (SBP <130).  ASA, beta blocker, statin. 


3. TTE today for operative risk stratification


4. Cardiac diet


5. HL IVF


6. OOB TC


Discharge Planning


1-2 after EVAR








Saqib Hercules MD Jan 21, 2017 09:02

## 2017-01-21 NOTE — PD.CARD.PN
Subjective


Subjective Remarks


Feels well, no chest pain, no shortness of breath, no abdominal pain





Objective


Medications





 Current Medications








 Medications


  (Trade)  Dose


 Ordered  Sig/Julianne


 Route  Start Time


 Stop Time Status Last Admin


 


  (Brevibloc Drip


 Inj Premix)  250 ml @ 0


 mls/hr  TITRATE


 IV  1/20/17 14:15


    1/21/17 04:16


 


 


  (Trandate Inj)  10 mg  Q4H  PRN


 IV PUSH  1/20/17 22:30


    1/21/17 07:15


 


 


  (Lopressor Inj)  5 mg  Q5M  PRN


 IV PUSH  1/20/17 22:30


     


 


 


  (Lipitor)  20 mg  HS


 PO  1/21/17 21:00


     


 


 


  (Ecotrin Ec)  325 mg  DAILY


 PO  1/21/17 09:00


    1/21/17 08:36


 


 


  (Heparin Inj)  5,000 units  Q12HR


 SQ  1/21/17 09:00


    1/21/17 08:35


 


 


  (Protonix)  40 mg  Q12HR


 PO  1/21/17 09:00


    1/21/17 08:35


 


 


  (Morphine Inj)  2 mg  Q3H  PRN


 IV PUSH  1/20/17 22:30


    1/20/17 22:59


 








Vital Signs / I&O





 Vital Signs








  Date Time  Temp Pulse Resp B/P Pulse Ox O2 Delivery O2 Flow Rate FiO2


 


1/21/17 08:00 98.1 76 18 118/67 96   


 


1/21/17 07:00  64      


 


1/21/17 04:00  76      


 


1/21/17 04:00 98.0 78 18 127/77 96   


 


1/21/17 00:00  64      


 


1/21/17 00:00 97.9 64 18 106/63 94   


 


1/20/17 23:00 98.0 66 18 115/73 94   


 


1/20/17 21:00  66      


 


1/20/17 21:00 97.8 66 18 115/80 96   


 


1/20/17 20:24  77 18  98   


 


1/20/17 20:23  77 18 111/66 98 Room Air  


 


1/20/17 19:30  75 18 111/64 98 Room Air  


 


1/20/17 18:30  64 16 116/71 100 Room Air  


 


1/20/17 16:08  68 16 147/81    


 


1/20/17 15:05  84 14 140/114 98 Room Air  


 


1/20/17 14:16  79 14 183/98 98 Room Air  


 


1/20/17 13:00  81 18 174/90 99 Room Air  


 


1/20/17 12:30   16  97 Room Air  


 


1/20/17 12:28   16     


 


1/20/17 11:03 98.4 84 16  99   








 I/O








 1/20/17 1/20/17 1/20/17 1/21/17 1/21/17 1/21/17





 07:00 15:00 23:00 07:00 15:00 23:00


 


Intake Total    1504 ml  


 


Output Total   900 ml 400 ml  


 


Balance   -900 ml 1104 ml  


 


      


 


Intake Oral    0 ml  


 


IV Total    1504 ml  


 


Output Urine Total   900 ml 400 ml  


 


# Voids   1   


 


# Bowel Movements    0  








Physical Exam


GENERAL: NAD


SKIN: Warm and dry.


HEAD: Atraumatic. Normocephalic. 


EYES: Pupils equal and round. No scleral icterus. No injection or drainage. 


ENT: No nasal bleeding or discharge.  Mucous membranes pink and moist.


NECK: Trachea midline. No JVD. 


CARDIOVASCULAR: Regular rate and rhythm.  


RESPIRATORY: No accessory muscle use. Decreased breath sounds bilaterally


GASTROINTESTINAL: Abdomen soft, non-tender, nondistended. Pulsatile mass noted.


MUSCULOSKELETAL: Extremities without clubbing, cyanosis, or edema. No obvious 

deformities. 


NEUROLOGICAL: Awake and alert. No obvious cranial nerve deficits.  Motor 

grossly within normal limits. Five out of 5 muscle strength in the arms and 

legs.  Normal speech.


PSYCHIATRIC: Appropriate mood and affect; insight and judgment normal.


Laboratory





Laboratory Tests








Test 1/20/17 1/20/17 1/20/17 1/20/17





 11:41 11:57 13:39 14:40


 


White Blood Count 14.6 TH/MM3  13.5 TH/MM3 


 


Red Blood Count 4.61 MIL/MM3  4.48 MIL/MM3 


 


Hemoglobin 13.4 GM/DL  13.2 GM/DL 


 


Hematocrit 39.4 %  38.7 % 


 


Mean Corpuscular Volume 85.4 FL  86.4 FL 


 


Mean Corpuscular Hemoglobin 29.0 PG  29.4 PG 


 


Mean Corpuscular Hemoglobin 34.0 %  34.0 % 





Concent    


 


Red Cell Distribution Width 13.5 %  13.3 % 


 


Platelet Count 194 TH/MM3  181 TH/MM3 


 


Mean Platelet Volume 6.7 FL  6.2 FL 


 


Neutrophils (%) (Auto) 77.4 %   


 


Lymphocytes (%) (Auto) 12.4 %   


 


Monocytes (%) (Auto) 7.7 %   


 


Eosinophils (%) (Auto) 0.1 %   


 


Basophils (%) (Auto) 2.4 %   


 


Neutrophils # (Auto) 11.4 TH/MM3   


 


Lymphocytes # (Auto) 1.8 TH/MM3   


 


Monocytes # (Auto) 1.1 TH/MM3   


 


Eosinophils # (Auto) 0.0 TH/MM3   


 


Basophils # (Auto) 0.3 TH/MM3   


 


CBC Comment DIFF FINAL    


 


Differential Comment     


 


Prothrombin Time 11.7 SEC  12.1 SEC 


 


Prothromb Time International 1.1 RATIO  1.1 RATIO 





Ratio    


 


Activated Partial 33.7 SEC  35.0 SEC 





Thromboplast Time    


 


Sodium Level 133 MEQ/L   


 


Potassium Level 4.5 MEQ/L   


 


Chloride Level 98 MEQ/L   


 


Carbon Dioxide Level 22.8 MEQ/L   


 


Anion Gap 12 MEQ/L   


 


Blood Urea Nitrogen 14 MG/DL   


 


Creatinine 1.20 MG/DL   


 


Estimat Glomerular Filtration 59 ML/MIN   





Rate    


 


Random Glucose 115 MG/DL   


 


Lactic Acid Level 2.3 mmol/L   


 


Calcium Level 9.3 MG/DL   


 


Total Bilirubin 1.5 MG/DL   


 


Aspartate Amino Transf 17 U/L   





(AST/SGOT)    


 


Alanine Aminotransferase 9 U/L   





(ALT/SGPT)    


 


Alkaline Phosphatase 100 U/L   


 


Troponin I 0.32 NG/ML   


 


B-Type Natriuretic Peptide 1172 PG/ML   


 


Total Protein 7.4 GM/DL   


 


Albumin 2.3 GM/DL   


 


Lipase 66 U/L   


 


Urine Collection Type  CLEAN CATCH   


 


Urine Color  YELLOW   


 


Urine Turbidity  SLIGHT   


 


Urine pH  6.5   


 


Urine Specific Gravity  1.035   


 


Urine Protein  300 OR GREATER  





  mg/dL  


 


Urine Glucose (UA)  NEG mg/dL  


 


Urine Ketones  NEG mg/dL  


 


Urine Occult Blood  LARGE   


 


Urine Nitrite  NEG   


 


Urine Bilirubin  NEG   


 


Urine Leukocyte Esterase  NEG   


 


Urine RBC  0-3 /hpf  


 


Urine WBC  0-2 /hpf  


 


Urine Squamous Epithelial  0-5 /hpf  





Cells    


 


Urine Amorphous Sediment  FEW   


 


Urine Hyaline Casts  15-19 /lpf  


 


Microscopic Urinalysis Comment  CULT NOT  





  INDICATED  


 


Urine Collection Time  11:57   


 


Blood Type    O POSITIVE 


 


Antibody Screen    NEGATIVE 


 


    





Test 1/21/17   





 04:50   


 


Sodium Level 132 MEQ/L   


 


Potassium Level 4.0 MEQ/L   


 


Chloride Level 99 MEQ/L   


 


Carbon Dioxide Level 22.9 MEQ/L   


 


Anion Gap 10 MEQ/L   


 


Blood Urea Nitrogen 20 MG/DL   


 


Creatinine 1.13 MG/DL   


 


Estimat Glomerular Filtration 63 ML/MIN   





Rate    


 


Random Glucose 81 MG/DL   


 


Calcium Level 8.6 MG/DL   


 


Magnesium Level 2.0 MG/DL   


 


Total Bilirubin 1.3 MG/DL   


 


Aspartate Amino Transf 22 U/L   





(AST/SGOT)    


 


Alanine Aminotransferase 9 U/L   





(ALT/SGPT)    


 


Alkaline Phosphatase 86 U/L   


 


Troponin I 0.20 NG/ML   


 


Total Protein 6.3 GM/DL   


 


Albumin 2.0 GM/DL   











Assessment and Plan


Problem List:  


(1) AAA (abdominal aortic aneurysm) without rupture


(2) Hypertension


(3) Troponin level elevated


(4) SOB (shortness of breath)


Assessment and Plan


1) AAA for EVAR with Dr. Hercules on Monday


2) Elevated troponin on arrival with pleural effusion, possible Type 2 in 

nature due to accelerated hypertension with /100


3) 2D echo pending


4) Agree with keeping BP systolically 100-120








Kaden Carmona DO Jan 21, 2017 09:41

## 2017-01-21 NOTE — MB
cc:

KADEN QUICK DO

****

 

 

DATE OF CONSULTATION:  1/20/2017

 

REASON FOR CONSULTATION

Elevated troponin.

 

HISTORY OF PRESENT ILLNESS

Saqib Novoa is a pleasant 76-year-old-male who originally presented to

[facility] in [place] due to abdominal pain.  He states that he

has had some loose bowel movements and emesis x2.  He underwent a CT scan that

showed an intact infrarenal abdominal aortic aneurysm that was 6 cm.  He states

that his pain is mostly in his abdomen and into his hips. He has not had pain

like this before.  I was called by the emergency room physician for an elevated

troponin.  Due to his present illness, he was transferred to [facility].  He denies chest pain at this time.

 

PAST MEDICAL HISTORY

Unknown as the patient does not go to doctors.

 

PAST SURGICAL HISTORY

1. Appendectomy.

2. Eye surgery.

 

ALLERGIES

NO KNOWN DRUG ALLERGIES.

 

MEDICATIONS

Docusate 100 mg as needed for constipation.

 

SOCIAL HISTORY

The patient smokes one pack a day.  Denies alcohol or drug abuse.

 

FAMILY HISTORY

Denies premature coronary artery disease or sudden cardiac death within the

family.

 

REVIEW OF SYSTEMS

Fourteen systems were reviewed in the history and physical as above, pertinent

positives and negatives as above.

 

PHYSICAL EXAMINATION

VITAL SIGNS:  Temperature 97.9, heart rate 64, blood pressure 106/63,

respirations 18, pulse ox 94% on room air.

GENERAL:  In general the patient appears well, in no acute distress.  Alert,

awake and oriented x3.

HEAD, EYES, EARS, NOSE AND THROAT:  Extraocular muscles intact.  Mucous

membranes moist.

NECK:  Neck is supple.  No JVD at 45 degrees.  No carotid bruits heard

bilaterally.  Carotid upstroke is brisk in nature.

HEART:  Heart is regular rate and rhythm.  Positive first and second heart

sounds with no noted murmurs, gallops or rubs.  PMI is difficult to locate.

LUNGS:  The lungs have decreased breath sounds bilaterally but no overt

wheezes, rales or rhonchi.

ABDOMEN:  Abdomen is soft with a palpable upper abdominal mass.

EXTREMITIES:  Extremities show no clubbing, cyanosis or edema.  Femoral and

distal pulses intact bilaterally.

NEUROLOGIC:  No focal deficits.

SKIN:  Warm, dry and intact.

OSTEOPATHIC:  No kyphoscoliosis, lordosis or paraspinal tender points.

 

LABORATORY FINDINGS

White blood cells 13.5, hemoglobin 13.2, hematocrit 38.7, platelets 181.

Potassium 4.5, BUN 14, creatinine 1.2, lactic acid 2.3, troponin 0.32, BNP

1172.

 

IMAGING

CT abdomen and pelvis (January 20, 2017), a 6.3 cm infrarenal abdominal aortic

aneurysm.

 

Electrocardiogram (January 20, 2017 at 1134), sinus rhythm, LVH with probable

secondary ST changes versus ischemia.

 

IMPRESSION

1. A 6.3 cm infrarenal abdominal aortic aneurysm.

2. LVH on electrocardiogram.

3. Elevated troponin, possible type 1 versus type 2.

4. Shortness of breath due to congestive heart failure.

5. Tobacco abuse.

6. Hypertension.

 

RECOMMENDATIONS

1. Mr. Novoa presented with abdominal pain and was found to have a 6.3 cm

   infrarenal abdominal aortic aneurysm.  Agree with Dr. Hercules's

   recommendation of keeping blood pressure controlled at 100-120 mg

   systolically.

2. He was also noted to have an elevated troponin and some mild congestive

   heart failure.  We will check a 2D echo to look at his overall left

   ventricular function, cardiac structure and possible valvulopathies.

3. We will discuss with Dr. Hercules and the patient about further

   recommendations for ischemic evaluation of Mr. Novoa.

4. Further recommendations will be made based on the patient's hospital

   course.

 

Thank you for allowing me to see Saqib Novoa, if there are any questions

please do not hesitate to call.

 

 

 

 

 

 

                              _________________________________

                              Kaden Quick DO

 

 

 

VGP/BJF

D:  1/21/2017/1:25 AM

T:  1/21/2017/11:40 AM

Visit #:  [id number]

Job #:  [id number]

## 2017-01-22 VITALS
SYSTOLIC BLOOD PRESSURE: 125 MMHG | TEMPERATURE: 97.6 F | OXYGEN SATURATION: 96 % | RESPIRATION RATE: 18 BRPM | HEART RATE: 68 BPM | DIASTOLIC BLOOD PRESSURE: 75 MMHG

## 2017-01-22 VITALS
RESPIRATION RATE: 16 BRPM | TEMPERATURE: 98.2 F | DIASTOLIC BLOOD PRESSURE: 60 MMHG | OXYGEN SATURATION: 96 % | SYSTOLIC BLOOD PRESSURE: 87 MMHG | HEART RATE: 74 BPM

## 2017-01-22 VITALS
RESPIRATION RATE: 16 BRPM | DIASTOLIC BLOOD PRESSURE: 75 MMHG | TEMPERATURE: 98 F | HEART RATE: 66 BPM | SYSTOLIC BLOOD PRESSURE: 113 MMHG | OXYGEN SATURATION: 94 %

## 2017-01-22 VITALS — OXYGEN SATURATION: 96 %

## 2017-01-22 VITALS — HEART RATE: 68 BPM

## 2017-01-22 VITALS
DIASTOLIC BLOOD PRESSURE: 60 MMHG | SYSTOLIC BLOOD PRESSURE: 128 MMHG | HEART RATE: 74 BPM | RESPIRATION RATE: 16 BRPM | OXYGEN SATURATION: 96 % | TEMPERATURE: 98.2 F

## 2017-01-22 VITALS
SYSTOLIC BLOOD PRESSURE: 127 MMHG | OXYGEN SATURATION: 96 % | TEMPERATURE: 98.1 F | HEART RATE: 66 BPM | DIASTOLIC BLOOD PRESSURE: 76 MMHG | RESPIRATION RATE: 20 BRPM

## 2017-01-22 VITALS — HEART RATE: 64 BPM

## 2017-01-22 VITALS
SYSTOLIC BLOOD PRESSURE: 121 MMHG | TEMPERATURE: 98.3 F | DIASTOLIC BLOOD PRESSURE: 69 MMHG | OXYGEN SATURATION: 95 % | RESPIRATION RATE: 20 BRPM | HEART RATE: 64 BPM

## 2017-01-22 VITALS — HEART RATE: 65 BPM

## 2017-01-22 LAB
ANION GAP SERPL CALC-SCNC: 8 MEQ/L (ref 5–15)
BUN SERPL-MCNC: 23 MG/DL (ref 7–18)
CHLORIDE SERPL-SCNC: 101 MEQ/L (ref 98–107)
ERYTHROCYTE [DISTWIDTH] IN BLOOD BY AUTOMATED COUNT: 14.2 % (ref 11.6–17.2)
GFR SERPLBLD BASED ON 1.73 SQ M-ARVRAT: 67 ML/MIN (ref 89–?)
HCO3 BLD-SCNC: 23.9 MEQ/L (ref 21–32)
HCT VFR BLD CALC: 33.8 % (ref 39–51)
MCH RBC QN AUTO: 29.1 PG (ref 27–34)
MCHC RBC AUTO-ENTMCNC: 33.7 % (ref 32–36)
MCV RBC AUTO: 86.3 FL (ref 80–100)
PLATELET # BLD: 157 TH/MM3 (ref 150–450)
POTASSIUM SERPL-SCNC: 3.9 MEQ/L (ref 3.5–5.1)
RBC # BLD AUTO: 3.92 MIL/MM3 (ref 4.5–5.9)
REVIEW FLAG: (no result)
SODIUM SERPL-SCNC: 133 MEQ/L (ref 136–145)
WBC # BLD AUTO: 10 TH/MM3 (ref 4–11)

## 2017-01-22 RX ADMIN — SODIUM CHLORIDE, PRESERVATIVE FREE SCH ML: 5 INJECTION INTRAVENOUS at 20:42

## 2017-01-22 RX ADMIN — MORPHINE SULFATE PRN MG: 2 INJECTION, SOLUTION INTRAMUSCULAR; INTRAVENOUS at 20:42

## 2017-01-22 RX ADMIN — MORPHINE SULFATE PRN MG: 2 INJECTION, SOLUTION INTRAMUSCULAR; INTRAVENOUS at 08:47

## 2017-01-22 RX ADMIN — HEPARIN SODIUM SCH UNITS: 10000 INJECTION, SOLUTION INTRAVENOUS; SUBCUTANEOUS at 08:19

## 2017-01-22 RX ADMIN — LABETALOL HYDROCHLORIDE PRN MG: 5 INJECTION, SOLUTION INTRAVENOUS at 07:29

## 2017-01-22 RX ADMIN — ATORVASTATIN CALCIUM SCH MG: 20 TABLET, FILM COATED ORAL at 20:41

## 2017-01-22 RX ADMIN — ESMOLOL HYDROCHLORIDE SCH MLS/HR: 10 INJECTION INTRAVENOUS at 01:06

## 2017-01-22 RX ADMIN — HEPARIN SODIUM SCH UNITS: 10000 INJECTION, SOLUTION INTRAVENOUS; SUBCUTANEOUS at 20:41

## 2017-01-22 RX ADMIN — MORPHINE SULFATE PRN MG: 2 INJECTION, SOLUTION INTRAMUSCULAR; INTRAVENOUS at 17:26

## 2017-01-22 RX ADMIN — METOPROLOL TARTRATE PRN MG: 1 INJECTION, SOLUTION INTRAVENOUS at 07:30

## 2017-01-22 RX ADMIN — METOPROLOL TARTRATE PRN MG: 1 INJECTION, SOLUTION INTRAVENOUS at 10:27

## 2017-01-22 RX ADMIN — ASPIRIN SCH MG: 325 TABLET, DELAYED RELEASE ORAL at 08:18

## 2017-01-22 RX ADMIN — LABETALOL HYDROCHLORIDE PRN MG: 5 INJECTION, SOLUTION INTRAVENOUS at 15:08

## 2017-01-22 RX ADMIN — MORPHINE SULFATE PRN MG: 2 INJECTION, SOLUTION INTRAMUSCULAR; INTRAVENOUS at 05:41

## 2017-01-22 RX ADMIN — LABETALOL HYDROCHLORIDE PRN MG: 5 INJECTION, SOLUTION INTRAVENOUS at 08:18

## 2017-01-22 RX ADMIN — SODIUM CHLORIDE, PRESERVATIVE FREE SCH ML: 5 INJECTION INTRAVENOUS at 08:19

## 2017-01-22 RX ADMIN — PANTOPRAZOLE SCH MG: 40 TABLET, DELAYED RELEASE ORAL at 20:41

## 2017-01-22 RX ADMIN — METOPROLOL TARTRATE PRN MG: 1 INJECTION, SOLUTION INTRAVENOUS at 21:24

## 2017-01-22 RX ADMIN — PANTOPRAZOLE SCH MG: 40 TABLET, DELAYED RELEASE ORAL at 08:18

## 2017-01-22 RX ADMIN — MORPHINE SULFATE PRN MG: 2 INJECTION, SOLUTION INTRAMUSCULAR; INTRAVENOUS at 02:52

## 2017-01-22 NOTE — PD.VS.PN
Pre-operative Note


Pre-operative diagnosis:


AAA, potentially symptomatic


Planned procedure:


EVAR


Labs:





Laboratory Tests








Test 1/22/17





 05:40


 


White Blood Count 10.0 


 


Red Blood Count 3.92 


 


Hemoglobin 11.4 


 


Hematocrit 33.8 


 


Mean Corpuscular Volume 86.3 


 


Mean Corpuscular Hemoglobin 29.1 


 


Mean Corpuscular Hemoglobin 33.7 





Concent 


 


Red Cell Distribution Width 14.2 


 


Platelet Count 157 


 


Mean Platelet Volume 7.4 


 


Sodium Level 133 


 


Potassium Level 3.9 


 


Chloride Level 101 


 


Carbon Dioxide Level 23.9 


 


Anion Gap 8 


 


Blood Urea Nitrogen 23 


 


Creatinine 1.08 


 


Estimat Glomerular Filtration 67 





Rate 


 


Random Glucose 109 


 


Calcium Level 8.1 








Blood:


T&S


EKG:


no ST changes


Imaging:





Last 48 hours Impressions








Abdomen/Pelvis CT 1/21/17 0000 Signed





Impressions: 





 Service Date/Time:  Saturday, January 21, 2017 10:59 - CONCLUSION:  1. Severe 





 atherosclerotic disease with fusiform infrarenal abdominal aortic aneurysm 





 measuring up to 6.1 x 5.7 cm. Additional details are given above. Additionally

, 





 there is induration/inflammation of the fat adjacent to the aneurysm 





 particularly on the left side. There is also a small amount of retroperitoneal 





 fluid in the left abdomen and pelvis. These findings may indicate 

inflammation. 





 2. 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 3. Abnormal 





 interstitial lung disease at the visualized lung bases suggestive of 

idiopathic 





 pulmonary fibrosis or usual interstitial pneumonitis (UIP).     Chilo Sanabria MD 


 


Abdomen/Pelvis CT 1/20/17 1127 Signed





Impressions: 





 Service Date/Time:  Friday, January 20, 2017 13:46 - CONCLUSION:  Abdominal 





 aortic aneurysm as described above.    There is no evidence for leakage.   





 Findings have been discussed with Dr. Rojas on today's date.     Janes Saab MD  FACR








Orders:


NPO after MN


MIVF with HCO3


Post-operative destination:


CICU


Operative site marked:  No (B groin access, marking not needed)


Consent:


Informed consent has been obtained from Saqib Novoa. I have explained the 

procedure in detail and discussed the risks, benefits, and potential 

complications. All questions have been answered.








Saqib Hercules MD Jan 22, 2017 08:24

## 2017-01-22 NOTE — PD.CARD.PN
Subjective


Subjective Remarks


No chest pain, no shortness of breath





Objective


Medications





 Current Medications








 Medications


  (Trade)  Dose


 Ordered  Sig/Julianne


 Route  Start Time


 Stop Time Status Last Admin


 


  (Brevibloc Drip


 Inj Premix)  250 ml @ 0


 mls/hr  TITRATE


 IV  1/20/17 14:15


    1/22/17 01:06


 


 


  (NS Flush)  2 ml  BID


 IV FLUSH  1/21/17 09:00


    1/22/17 08:19


 


 


  (NS Flush)  2 ml  UNSCH  PRN


 IV FLUSH  1/20/17 22:30


     


 


 


  (Trandate Inj)  10 mg  Q4H  PRN


 IV PUSH  1/20/17 22:30


    1/22/17 08:18


 


 


  (Lopressor Inj)  5 mg  Q5M  PRN


 IV PUSH  1/20/17 22:30


    1/22/17 10:27


 


 


  (Lipitor)  20 mg  HS


 PO  1/21/17 21:00


    1/21/17 20:48


 


 


  (Ecotrin Ec)  325 mg  DAILY


 PO  1/21/17 09:00


    1/22/17 08:18


 


 


  (Heparin Inj)  5,000 units  Q12HR


 SQ  1/21/17 09:00


    1/22/17 08:19


 


 


  (Protonix)  40 mg  Q12HR


 PO  1/21/17 09:00


    1/22/17 08:18


 


 


 Morphine Sulfate


 2 mg  2 mg  Q3H  PRN


 IV PUSH  1/20/17 22:30


    1/22/17 08:47


 


 


  (Sodium


 Bicarbonate 8.4%


 Inj/1/2 NS 1000


 ml Inj)  1,050 ml @ 


 63 mls/hr  [id number]


 IV  1/23/17 00:00


     


 








Vital Signs / I&O





 Vital Signs








  Date Time  Temp Pulse Resp B/P Pulse Ox O2 Delivery O2 Flow Rate FiO2


 


1/22/17 11:13 98.1 66 20 127/76 96   


 


1/22/17 11:00  65      


 


1/22/17 08:52   20     


 


1/22/17 07:25     96   21


 


1/22/17 07:13 98.2 74 16 128/60 96   


 


1/22/17 07:00  64      


 


1/22/17 03:41  71      


 


1/22/17 03:41 98.2 74 16 87/60 96   


 


1/21/17 23:26  71      


 


1/21/17 23:26 97.9 82 16 116/66 96   


 


1/21/17 20:46     99 Nasal Cannula 2.00 


 


1/21/17 19:50 97.6 79 18 108/63 99   


 


1/21/17 19:00  70      


 


1/21/17 17:20     97 Nasal Cannula 2.00 


 


1/21/17 15:48 98.0 76 18 115/70 95   


 


1/21/17 15:00  70      








 I/O








 1/21/17 1/21/17 1/21/17 1/22/17 1/22/17 1/22/17





 07:00 15:00 23:00 07:00 15:00 23:00


 


Intake Total 1504 ml  1536 ml 517 ml  


 


Output Total 400 ml  1025 ml 300 ml  


 


Balance 1104 ml  511 ml 217 ml  


 


      


 


Intake Oral 0 ml  960 ml 240 ml  


 


IV Total 1504 ml  576 ml 277 ml  


 


Output Urine Total 400 ml  1025 ml 300 ml  


 


# Bowel Movements 0  0   








Physical Exam


GENERAL: NAD


SKIN: Warm and dry.


HEAD: Atraumatic. Normocephalic. 


EYES: Pupils equal and round. No scleral icterus. No injection or drainage. 


ENT: No nasal bleeding or discharge.  Mucous membranes pink and moist.


NECK: Trachea midline. No JVD. 


CARDIOVASCULAR: Regular rate and rhythm.  


RESPIRATORY: No accessory muscle use. Decreased breath sounds bilaterally


GASTROINTESTINAL: Abdomen soft, non-tender, nondistended. Pulsatile mass noted.


MUSCULOSKELETAL: Extremities without clubbing, cyanosis, or edema. No obvious 

deformities. 


NEUROLOGICAL: Awake and alert. No obvious cranial nerve deficits.  Motor 

grossly within normal limits. Five out of 5 muscle strength in the arms and 

legs.  Normal speech.


PSYCHIATRIC: Appropriate mood and affect; insight and judgment normal.


Laboratory





Laboratory Tests








Test 1/22/17 1/22/17





 04:53 05:40


 


Nasal Screen MRSA (PCR) NEGATIVE  


 


White Blood Count  10.0 TH/MM3


 


Red Blood Count  3.92 MIL/MM3


 


Hemoglobin  11.4 GM/DL


 


Hematocrit  33.8 %


 


Mean Corpuscular Volume  86.3 FL


 


Mean Corpuscular Hemoglobin  29.1 PG


 


Mean Corpuscular Hemoglobin  33.7 %





Concent  


 


Red Cell Distribution Width  14.2 %


 


Platelet Count  157 TH/MM3


 


Mean Platelet Volume  7.4 FL


 


Sodium Level  133 MEQ/L


 


Potassium Level  3.9 MEQ/L


 


Chloride Level  101 MEQ/L


 


Carbon Dioxide Level  23.9 MEQ/L


 


Anion Gap  8 MEQ/L


 


Blood Urea Nitrogen  23 MG/DL


 


Creatinine  1.08 MG/DL


 


Estimat Glomerular Filtration  67 ML/MIN





Rate  


 


Random Glucose  109 MG/DL


 


Calcium Level  8.1 MG/DL











Assessment and Plan


Problem List:  


(1) AAA (abdominal aortic aneurysm) without rupture


(2) Hypertension


(3) Troponin level elevated


(4) SOB (shortness of breath)


Assessment and Plan


1) AAA for EVAR with Dr. Hercules on Monday


2) Elevated troponin on arrival with pleural effusion, possible Type 2 in 

nature due to accelerated hypertension with /100


3) 2D echo pending


4) Agree with keeping BP systolically 100-120


5) Overall needs BP control and tobacco cessation, follow up outpatient for 

consideration of stress testing








Kaden Carmona DO Jan 22, 2017 13:24

## 2017-01-22 NOTE — ECHLIM
Study

 

Study Date:01/21/2017

 

 

 

STUDY CONCLUSIONS

 

SUMMARY

 

- Left ventricle: The cavity size was normal. Wall thickness was

normal. Systolic function was normal. The estimated ejection

fraction was in the range of 55% to 60%. Wall motion was normal;

there were no regional wall motion abnormalities.

- Aortic valve: Valve area: 2.97cm^2(VTI). Valve area: 2.79cm^2

(Vmax).

- Mitral valve: Valve area by continuity equation (using LVOT

flow): 2.42cm^2.

- Pulmonary arteries: PA peak pressure: 34mm Hg (S).

 

-------------------------------------------------------------------

If LV function is below 40, please consider prescribing an ACEI or

ARB or document rationale for non-use.

 

-------------------------------------------------------------------

PROCEDURE DATA

 

STUDY STATUS:

Elective. Procedure: Transthoracic echocardiography.

Image quality was poor. Scanning was performed from the

parasternal, apical, and subcostal acoustic windows. Study

completion: The patient tolerated the procedure well.

Transthoracic echocardiography. M-mode, complete 2D, complete

spectral Doppler, and color Doppler. Height: Height: 68in. Weight:

Weight: 162.7lb. Body mass index: BMI: 24.8kg/m^2. Body surface

area:   BSA: 1.87m^2. Patient status: Inpatient.

 

-------------------------------------------------------------------

CARDIAC ANATOMY

 

LEFT VENTRICLE:

The cavity size was normal. Wall thickness was

normal. Systolic function was normal. The estimated ejection

fraction was in the range of 55% to 60%. Wall motion was normal;

there were no regional wall motion abnormalities.

 

AORTIC VALVE:

Trileaflet; normal thickness leaflets. Doppler:

Transvalvular velocity was within the normal range. There was no

stenosis. No regurgitation.  Valve area: 2.97cm^2(VTI). Indexed

valve area: 1.59cm^2/m^2 (VTI). Valve area: 2.79cm^2 (Vmax).

Indexed valve area: 1.49cm^2/m^2 (Vmax).  Mean gradient: 4mm Hg

(S).

 

AORTA:

Aortic root: The aortic root was normal in size.

 

MITRAL VALVE:

Structurally normal valve. Doppler: Transvalvular

velocity was within the normal range. There was no evidence for

stenosis. Trace to mild regurgitation.  Valve area by continuity

equation (using LVOT flow): 2.42cm^2. Indexed valve area by

continuity equation (using LVOT flow): 1.29cm^2/m^2.  Mean

gradient: 2mm Hg (D). Peak gradient: 5mm Hg (D).

 

LEFT ATRIUM:

The atrium was normal in size.

 

RIGHT VENTRICLE:

The cavity size was normal. Wall thickness was

normal.

 

PULMONIC VALVE:

Doppler: Transvalvular velocity was within the

normal range. There was no evidence for stenosis. No regurgitation.

 

TRICUSPID VALVE:

Structurally normal valve. Doppler: Transvalvular

velocity was within the normal range. No regurgitation.

 

PULMONARY ARTERY:

The main pulmonary artery was normal-sized.

Systolic pressure was within the normal range.

 

RIGHT ATRIUM:

The atrium was normal in size.

 

PERICARDIUM:

There was no pericardial effusion.

 

SYSTEMIC VEINS:

Inferior vena cava: The vessel was normal in size.

 

-------------------------------------------------------------------

 

Patient weight: 162.7lb

_Ejection fraction:_ 65-75%

_Fractional shortening:_ 32%

up to 5Kg 5-11.5Kg 11.6-22.9Kg 23-45Kg 45-57Kg

Aortic Root 7-13      <17      13-22       17-27   17-27

LA diam     6-13      <23      24-38       33-47   37-40

RVID        10-17     7-15     7-15        7-18    8-17

LVIDd       12-22     <32      24-38       33-47   37-40

LVPW        2-4       3-6      5-7         6-8     7-8

IVS         2-4       3-6      5-7         6-8     7-8

 

-------------------------------------------------------------------

 

BASIC MEASUREMENTS                                     ADULT NORMAL

Left ventricle

LV internal dimension, ED, chordal      *34.6 mm       43-52

level, PLAX

LV internal dimension, ES, chordal       27.1 mm       23-38

level, PLAX

Fractional shortening, chordal level,     *22 %        >29

PLAX

LV posterior wall thickness, ED          11.8 mm       ------------

IVS/LVPW ratio, ED                       1.19          <1.3

Ventricular septum

Septal thickness, ED                       14 mm       ------------

Aortic valve

Leaflet separation                         18 mm       15-26

Aorta

Root diameter, ED                          38 mm       ------------

Left atrium

Anterior-posterior dimension               33 mm       ------------

Anterior-posterior dimension index       1.76 cm/m^2   <2.2

 

BASIC MEASUREMENTS                                     ADULT NORMAL

Aortic valve

Leaflet separation                         18 mm       15-26

 

DOPPLER MEASUREMENTS                                   ADULT NORMAL

Main pulmonary artery

Pressure, S                               *34 mm Hg    =30

Aortic valve

Peak velocity, S                          131 cm/s     ------------

Mean velocity, S                         88.4 cm/s     ------------

VTI, S                                   29.2 cm       ------------

Mean gradient, S                            4 mm Hg    ------------

Valve area, VTI                          2.97 cm^2     ------------

Valve area index, VTI                    1.59 cm^2/m^2 ------------

Valve area, Vmax                         2.79 cm^2     ------------

Valve area index, Vmax                   1.49 cm^2/m^2 ------------

Mitral valve

Peak E-wave velocity                     83.9 cm/s     ------------

Peak A-wave velocity                      114 cm/s     ------------

Mean velocity, D                         67.5 cm/s     ------------

Deceleration time                        *232 ms       150-230

Mean gradient, D                            2 mm Hg    ------------

Peak gradient, D                            5 mm Hg    ------------

Peak E/A ratio                            0.7          ------------

Valve area, LVOT continuity              2.42 cm^2     ------------

Valve area index, LVOT continuity        1.29 cm^2/m^2 ------------

Tricuspid valve

Regurgitant peak velocity                 279 cm/s     ------------

Peak RV-RA gradient, S                     31 mm Hg    ------------

Maximal regurgitant velocity              279 cm/s     ------------

Systemic veins

Estimated CVP                               5 mm Hg    ------------

Right ventricle

RV pressure, S                            *36 mm Hg    <30

Pulmonic valve

Peak velocity, S                         63.2 cm/s     ------------

 

LEGEND:

Mean values are shown as u=mean value.

Asterisk (*) marks values outside specified normal range.

Prepared and signed by

 

Glenn Reina

[id number]

## 2017-01-23 VITALS
RESPIRATION RATE: 18 BRPM | OXYGEN SATURATION: 97 % | DIASTOLIC BLOOD PRESSURE: 83 MMHG | HEART RATE: 85 BPM | TEMPERATURE: 98.8 F | SYSTOLIC BLOOD PRESSURE: 150 MMHG

## 2017-01-23 VITALS
SYSTOLIC BLOOD PRESSURE: 159 MMHG | TEMPERATURE: 99.3 F | HEART RATE: 77 BPM | RESPIRATION RATE: 17 BRPM | OXYGEN SATURATION: 95 % | DIASTOLIC BLOOD PRESSURE: 76 MMHG

## 2017-01-23 VITALS
TEMPERATURE: 97.9 F | DIASTOLIC BLOOD PRESSURE: 70 MMHG | RESPIRATION RATE: 16 BRPM | HEART RATE: 66 BPM | OXYGEN SATURATION: 94 % | SYSTOLIC BLOOD PRESSURE: 130 MMHG

## 2017-01-23 VITALS
TEMPERATURE: 97.5 F | HEART RATE: 76 BPM | OXYGEN SATURATION: 92 % | RESPIRATION RATE: 16 BRPM | SYSTOLIC BLOOD PRESSURE: 150 MMHG | DIASTOLIC BLOOD PRESSURE: 81 MMHG

## 2017-01-23 VITALS — HEART RATE: 77 BPM

## 2017-01-23 VITALS — HEART RATE: 71 BPM

## 2017-01-23 PROCEDURE — 04V03DZ RESTRICTION OF ABDOMINAL AORTA WITH INTRALUMINAL DEVICE, PERCUTANEOUS APPROACH: ICD-10-PCS | Performed by: SURGERY

## 2017-01-23 RX ADMIN — SODIUM CHLORIDE, PRESERVATIVE FREE SCH ML: 5 INJECTION INTRAVENOUS at 21:07

## 2017-01-23 RX ADMIN — LABETALOL HYDROCHLORIDE PRN MG: 5 INJECTION, SOLUTION INTRAVENOUS at 13:07

## 2017-01-23 RX ADMIN — PHENYLEPHRINE HYDROCHLORIDE SCH MLS/HR: 10 INJECTION INTRAVENOUS at 21:40

## 2017-01-23 RX ADMIN — METOPROLOL TARTRATE SCH MG: 50 TABLET, FILM COATED ORAL at 21:07

## 2017-01-23 RX ADMIN — PANTOPRAZOLE SCH MG: 40 TABLET, DELAYED RELEASE ORAL at 09:00

## 2017-01-23 RX ADMIN — HEPARIN SODIUM SCH MLS/HR: 10000 INJECTION, SOLUTION INTRAVENOUS; SUBCUTANEOUS at 16:40

## 2017-01-23 RX ADMIN — PANTOPRAZOLE SCH MG: 40 TABLET, DELAYED RELEASE ORAL at 21:07

## 2017-01-23 RX ADMIN — MORPHINE SULFATE PRN MG: 2 INJECTION, SOLUTION INTRAMUSCULAR; INTRAVENOUS at 17:44

## 2017-01-23 RX ADMIN — FAMOTIDINE SCH MG: 20 TABLET, FILM COATED ORAL at 21:07

## 2017-01-23 RX ADMIN — HEPARIN SODIUM SCH MLS/HR: 10000 INJECTION, SOLUTION INTRAVENOUS; SUBCUTANEOUS at 00:31

## 2017-01-23 RX ADMIN — MORPHINE SULFATE PRN MG: 2 INJECTION, SOLUTION INTRAMUSCULAR; INTRAVENOUS at 00:36

## 2017-01-23 RX ADMIN — PHENYLEPHRINE HYDROCHLORIDE SCH MLS/HR: 10 INJECTION INTRAVENOUS at 05:00

## 2017-01-23 NOTE — EKG
Date Performed: 01/20/2017       Time Performed: 11:34:52

 

PTAGE:      76 years

 

EKG:      Sinus rhythm 

 

 LVH with secondary repolarization abnormality Anterolateral ST-T changes may be due to hypertrophy a
nd/or ischemia Abnormal ECG 

 

NO PREVIOUS TRACING            

 

DOCTOR:   Kaden Carmona  Interpretating Date/Time  01/23/2017 23:56:49

## 2017-01-23 NOTE — HHI.PR
__________________________________________________





Immediate Post Op Note


Procedure Date:


Jan 23, 2017


Pre Op Diagnosis:  


AAA


Post Op Diagnosis:  


AAA


Surgeon:


Saqib Hercules


Assistant(s):


none


Procedure:


EVAR


B CFA Perclose


Findings:


Successful repair, maintenance of perfusion distally as well as both renal 

arteries and hypogastric arteries


Complications:


none apparent


Specimen(s) removed:


none


Estimated blood loss:


75mL


Anesthesia:  General


Drains:  None


Fluids:  900 mL x'oid; 225 mL UOP


Patient to:  PACU


Patient Condition:  Good


Implant/Devices:  SEE IMPLANT LOG (if applicable)


Date/Time of Procedure:  SEE SURGICAL CARE RECORD








Saqib Hercules MD Jan 23, 2017 10:13

## 2017-01-24 VITALS
HEART RATE: 79 BPM | RESPIRATION RATE: 16 BRPM | DIASTOLIC BLOOD PRESSURE: 76 MMHG | TEMPERATURE: 98.1 F | OXYGEN SATURATION: 96 % | SYSTOLIC BLOOD PRESSURE: 139 MMHG

## 2017-01-24 VITALS
OXYGEN SATURATION: 98 % | SYSTOLIC BLOOD PRESSURE: 125 MMHG | HEART RATE: 63 BPM | TEMPERATURE: 98.6 F | RESPIRATION RATE: 18 BRPM | DIASTOLIC BLOOD PRESSURE: 69 MMHG

## 2017-01-24 VITALS
RESPIRATION RATE: 16 BRPM | DIASTOLIC BLOOD PRESSURE: 62 MMHG | TEMPERATURE: 97.9 F | HEART RATE: 67 BPM | SYSTOLIC BLOOD PRESSURE: 112 MMHG | OXYGEN SATURATION: 96 %

## 2017-01-24 VITALS — HEART RATE: 66 BPM

## 2017-01-24 VITALS — HEART RATE: 72 BPM

## 2017-01-24 VITALS
DIASTOLIC BLOOD PRESSURE: 66 MMHG | SYSTOLIC BLOOD PRESSURE: 134 MMHG | OXYGEN SATURATION: 97 % | HEART RATE: 74 BPM | RESPIRATION RATE: 20 BRPM | TEMPERATURE: 98.6 F

## 2017-01-24 VITALS — OXYGEN SATURATION: 98 %

## 2017-01-24 VITALS — HEART RATE: 70 BPM

## 2017-01-24 LAB
ANION GAP SERPL CALC-SCNC: 7 MEQ/L (ref 5–15)
BUN SERPL-MCNC: 16 MG/DL (ref 7–18)
CHLORIDE SERPL-SCNC: 98 MEQ/L (ref 98–107)
ERYTHROCYTE [DISTWIDTH] IN BLOOD BY AUTOMATED COUNT: 14.2 % (ref 11.6–17.2)
GFR SERPLBLD BASED ON 1.73 SQ M-ARVRAT: 65 ML/MIN (ref 89–?)
HCO3 BLD-SCNC: 25.6 MEQ/L (ref 21–32)
HCT VFR BLD CALC: 32.1 % (ref 39–51)
HCT VFR BLD CALC: 33.4 % (ref 39–51)
MAGNESIUM SERPL-MCNC: 2.2 MG/DL (ref 1.5–2.5)
MCH RBC QN AUTO: 28.9 PG (ref 27–34)
MCHC RBC AUTO-ENTMCNC: 33.1 % (ref 32–36)
MCV RBC AUTO: 87.2 FL (ref 80–100)
PLATELET # BLD: 156 TH/MM3 (ref 150–450)
POTASSIUM SERPL-SCNC: 4.2 MEQ/L (ref 3.5–5.1)
RBC # BLD AUTO: 3.83 MIL/MM3 (ref 4.5–5.9)
REVIEW FLAG: (no result)
REVIEW FLAG: (no result)
SODIUM SERPL-SCNC: 131 MEQ/L (ref 136–145)
WBC # BLD AUTO: 11.2 TH/MM3 (ref 4–11)

## 2017-01-24 RX ADMIN — FAMOTIDINE SCH MG: 20 TABLET, FILM COATED ORAL at 08:41

## 2017-01-24 RX ADMIN — PANTOPRAZOLE SCH MG: 40 TABLET, DELAYED RELEASE ORAL at 08:36

## 2017-01-24 RX ADMIN — METOPROLOL TARTRATE SCH MG: 50 TABLET, FILM COATED ORAL at 08:41

## 2017-01-24 RX ADMIN — HEPARIN SODIUM SCH MLS/HR: 10000 INJECTION, SOLUTION INTRAVENOUS; SUBCUTANEOUS at 09:20

## 2017-01-24 RX ADMIN — MORPHINE SULFATE PRN MG: 2 INJECTION, SOLUTION INTRAMUSCULAR; INTRAVENOUS at 01:52

## 2017-01-24 RX ADMIN — SODIUM CHLORIDE, PRESERVATIVE FREE SCH ML: 5 INJECTION INTRAVENOUS at 08:23

## 2017-01-24 NOTE — PD.VS.DC
__________________________________________________





Discharge Summary


Admission Date:


Jan 20, 2017 at 14:22


Discharge Date:  Jan 24, 2017


Admission Diagnosis:  


(1) AAA (abdominal aortic aneurysm) without rupture


Discharge Diagnosis:  


(1) AAA (abdominal aortic aneurysm) without rupture


Diagnosis:  Principal


Status:  Chronic


Brief History from admission


77 yo male with limited medical history who presented through ED with 2 days of 

abdominal pain and loose stools and emesis x 2.  Pt underwent CT scan that 

showed intact infrarenal AAA.  His pain is mostly in the LEFT hip and radiates 

to back and lower abdominal quadrants.  Never had this before. 


Post Op EVAR , doing well states he wants to go home.


Procedure(s):  


EVAR


Significant Findings





Laboratory Tests








Test 1/22/17 1/24/17 1/24/17





 05:40 06:27 12:00


 


Red Blood Count 3.92 MIL/MM3  3.83 MIL/MM3





 (4.50-5.90)  (4.50-5.90)


 


Hemoglobin 11.4 GM/DL 10.8 GM/DL 11.1 GM/DL





 (13.0-17.0) (13.0-17.0) (13.0-17.0)


 


Hematocrit 33.8 % 32.1 % 33.4 %





 (39.0-51.0) (39.0-51.0) (39.0-51.0)


 


Sodium Level 133 MEQ/L 131 MEQ/L 





 (136-145) (136-145) 


 


Blood Urea Nitrogen 23 MG/DL (7-18)  


 


Estimat Glomerular Filtration 67 ML/MIN (>89) 65 ML/MIN (>89) 





Rate   


 


Random Glucose 109 MG/DL  





 ()  


 


Calcium Level 8.1 MG/DL 7.7 MG/DL 





 (8.5-10.1) (8.5-10.1) 


 


White Blood Count   11.2 TH/MM3





   (4.0-11.0)








Hospital Course:


GENERAL: Pt alert and oriented times 3 in NAD, resting comfortably in bed


SKIN: Warm and dry. Bilat groin incisions intact no redness drainage or 

swelling noted


NECK: Supple, trachea midline. No JVD or lymphadenopathy.


CARDIOVASCULAR: Regular rate and rhythm without murmurs, gallops, or rubs. 


RESPIRATORY: Breath sounds equal bilaterally. No accessory muscle use.


GASTROINTESTINAL: Abdomen soft, non-tender, nondistended. 


MUSCULOSKELETAL: No cyanosis, or edema. 








Discharge Condition:  Good


Discharge Disposition:  Discharge Home


Discharge Instructions:


Follow-up in clinic at scheduled time 03/03/17 at 0915


Leave incisions open to air 


Call the office should you have any questions or concerns


Take prescribed medications as indicated





[facility]  


[phone]


Any questions or concerns: Call [facility] at [facility]  [phone]








Lore Santamaria Jan 24, 2017 14:12

## 2017-01-24 NOTE — PD.VS.PN
Subjective


POD #:  1


Procedure(s):  


EVAR


Subjective/Hospital Course


Abdominal pain resolved.  Feels well.  Khalif po.





Objective


Neuro:


LEE, intact


Pulmonary:


nonlabored breathing


Cardiac:


reg rate


FEN/GI:


khalif po; abdomen soft, NT


:


+ void after Blum out.


ID Antibiotics(date/duration):


none


ID Cultures:


none


Heme:


Hct 32, likely dilutional


Laboratory





Laboratory Tests








Test 1/24/17





 06:27


 


Hemoglobin 10.8 


 


Hematocrit 32.1 











Assessment and Plan


Plan


1. s/p EVAR.  Looks clinically great and has resolution of abdominal pain


2. Recheck Hct at 1100 and if stable, ok to d/c home.


3. Will f/u in 1m with post-op EVAR CT.


4. Needs CV protection meds: ASA, statin, beta blockade


Discharge Planning


today if Hct ok








Saqib Herucles MD Jan 24, 2017 07:20

## 2017-01-25 NOTE — MP
cc:

CYNDIE HERCULES MD

****

 

 

DATE OF SURGERY:

01/

 

PREOPERATIVE DIAGNOSIS:

Abdominal aortic aneurysm, symptomatic.

 

POSTOPERATIVE DIAGNOSIS:

Abdominal aortic aneurysm, symptomatic.

 

PROCEDURE:

1.  Endovascular exclusion of abdominal aortic aneurysm.

2.  Bilateral common femoral artery Perclose.

 

ATTENDING SURGEON

Cyndie Hercules MD

 

ASSISTANT:

None.

 

ANESTHESIA

General.

 

INDICATION

Mr. Novoa is a 76-year-old gentleman with a symptomatic abdominal aortic

aneurysm, he presented as a hospital/hospital transfer from an outside

emergency room and after the discussion with the patient and evaluation of the

CT scan he was offered endovascular repair.

 

DESCRIPTION OF THE PROCEDURE

Informed consent was obtained from the patient. He was taken to the operating

room and placed supine on the operating table. An appropriate timeout was taken

to ensure the patient's identity, operative site and planned procedure. The

administration of 2 grams of Ancef was initiated prior to skin incision and

will be discontinued after a single prep dose. Everyone in the room agreed with

time out and proceeded.

 

He was prepped from his nipples to his knees. A 21 gauge micropuncture needle

was used to access the bilateral common femoral arteries. This was exchanged

using Seldinger technique, micropuncture sheath through which a 0.05 Storq wire

was inserted, the micropuncture sheath was exchanged for a 5 Finnish sheath, and

then two Perclose Pro-glide sutures were inserted and tagged down and these

will be used later.  A short A-frame sheath was inserted on the left hand side,

some of the processes were encountered with micropuncture sheath, angiographic

confirmation and two Perclose followed by an 8 Finnish, 25 cm sheath. The

patient was systemically heparinized throughout the remainder of the case.

 

ACT was kept greater 250 with Heparin boluses. A stork wire was advanced to the

proximal descending aorta and over the left hand Storq wire a straight marker

flush catheter was placed over the right hand storq wire, a catheter was

placed, exchanged for a Lunderquist. The 8 Finnish sheath was removed and the

elana dilator sheath were used to dilate the skin and subcutaneous tissue and

arteriotomy in the main device which was a cook venous 26 x 111 was inserted

and interval angiography was performed, which located the renal arteries as

well to the aortic bifurcation.

 

The graft was pulled down to the contralateral gate. The top cath was deployed

with interval angiography to determine the location of the renal arteries. A

roadrunner wire was placed through the flush catheter and the flush catheter

was exchanged for a Cobra catheter and the roadrunner and Cobra were used to

navigate the gate and the angiogram confirmed that were indeed in the

contralateral gate.

 

A Lunderquist wire was then introduced and a Marker catheter was then

reintroduced and angiogram confirmed the location of the left hypogastric

artery. The left limb which was a 24 x 74 Zenith flex was inserted and deployed

without difficulty. The remainder of the main device was deployed, the soft

cath was recaptured and removed. A marked catheter was placed up the right hand

side, the location of the right hypogastric artery was identified in its

ipsilateral limb which was a 16 x 56 which was inserted and deployed without

difficulty.

 

A coated balloon was used to balloon the proximal and distal ends as well as

the junctions and the completion angiogram showed excellent result without any

recoil. There was a minor filling at the aneurysm sac but this was extremely

sluggish. Prior catheter and sheath were removed. Perclose were tied down.

 

 

 

 

 

 

 

 

Pulses were achieved in the feet and heparin reversed with protamine. The

wounds were irrigated, made hemostatic and closed with 4-0 Maxon. Sponge and

needle counts were correct at the end of the case, I was present and scrubbed

throughout the entire procedure.

 

                              _________________________________

                              MD GAEL You/annabella

D:  1/23/2017/10:24 AM

T:  1/25/2017/7:39 PM

Visit #:  [id number]

Job #:  [id number]

## 2017-02-02 NOTE — PD.VS.DC
__________________________________________________





Discharge Summary


Admission Date:


Jan 20, 2017 at 14:22


Discharge Date:  Jan 24, 2017


Admission Diagnosis:  


(1) AAA (abdominal aortic aneurysm) without rupture


Discharge Diagnosis:  


(1) AAA (abdominal aortic aneurysm) without rupture


Diagnosis:  Principal


Status:  Chronic


Brief History from admission


75 yo male with limited medical history who presented through ED with 2 days of 

abdominal pain and loose stools and emesis x 2.  Pt underwent CT scan that 

showed intact infrarenal AAA.  His pain is mostly in the LEFT hip and radiates 

to back and lower abdominal quadrants.  Never had this before. 


Post Op EVAR , doing well states he wants to go home.


Procedure(s):  


EVAR


Significant Findings


GENERAL: alert and oriented times 3 


SKIN: Warm and dry.


HEAD: Normocephalic.


EYES: No scleral icterus. No injection or drainage. 


NECK: Supple, trachea midline. No JVD or lymphadenopathy.


CARDIOVASCULAR: Regular rate and rhythm without murmurs, gallops, or rubs. 


RESPIRATORY: Breath sounds equal bilaterally. No accessory muscle use.


GASTROINTESTINAL: Abdomen soft, non-tender, nondistended. 


MUSCULOSKELETAL: No cyanosis, or edema. 





Hospital Course:


Vitals signs reviewed pt ok for D/C


Discharge Condition:  Good


Discharge Disposition:  Discharge Home


Discharge Instructions:


follow up in out patient clinic


Any questions or concerns: Call [facility] V[facility] Surgery at [facility]  [phone]








Lore Santamaria Feb 2, 2017 14:14

## 2017-06-16 ENCOUNTER — HOSPITAL ENCOUNTER (EMERGENCY)
Dept: HOSPITAL 17 - NEPE | Age: 77
LOS: 1 days | Discharge: HOME | End: 2017-06-17
Payer: MEDICARE

## 2017-06-16 VITALS
SYSTOLIC BLOOD PRESSURE: 185 MMHG | OXYGEN SATURATION: 99 % | HEART RATE: 84 BPM | RESPIRATION RATE: 20 BRPM | DIASTOLIC BLOOD PRESSURE: 88 MMHG

## 2017-06-16 VITALS
HEART RATE: 90 BPM | SYSTOLIC BLOOD PRESSURE: 172 MMHG | DIASTOLIC BLOOD PRESSURE: 92 MMHG | RESPIRATION RATE: 18 BRPM | OXYGEN SATURATION: 99 %

## 2017-06-16 VITALS — RESPIRATION RATE: 18 BRPM | OXYGEN SATURATION: 100 %

## 2017-06-16 VITALS — BODY MASS INDEX: 18.94 KG/M2 | WEIGHT: 132.28 LBS | HEIGHT: 70 IN

## 2017-06-16 VITALS
OXYGEN SATURATION: 98 % | HEART RATE: 94 BPM | SYSTOLIC BLOOD PRESSURE: 223 MMHG | DIASTOLIC BLOOD PRESSURE: 102 MMHG | RESPIRATION RATE: 14 BRPM

## 2017-06-16 DIAGNOSIS — T82.330A: ICD-10-CM

## 2017-06-16 DIAGNOSIS — K59.00: Primary | ICD-10-CM

## 2017-06-16 DIAGNOSIS — F17.210: ICD-10-CM

## 2017-06-16 LAB
ALP SERPL-CCNC: 120 U/L (ref 45–117)
ALT SERPL-CCNC: 7 U/L (ref 12–78)
ANION GAP SERPL CALC-SCNC: 11 MEQ/L (ref 5–15)
APTT BLD: 32.9 SEC (ref 24.3–30.1)
AST SERPL-CCNC: 18 U/L (ref 15–37)
BASOPHILS # BLD AUTO: 0.1 TH/MM3 (ref 0–0.2)
BASOPHILS NFR BLD: 0.6 % (ref 0–2)
BILIRUB SERPL-MCNC: 0.5 MG/DL (ref 0.2–1)
BUN SERPL-MCNC: 12 MG/DL (ref 7–18)
CHLORIDE SERPL-SCNC: 104 MEQ/L (ref 98–107)
EOSINOPHIL # BLD: 0.1 TH/MM3 (ref 0–0.4)
EOSINOPHIL NFR BLD: 1.1 % (ref 0–4)
ERYTHROCYTE [DISTWIDTH] IN BLOOD BY AUTOMATED COUNT: 16.1 % (ref 11.6–17.2)
GFR SERPLBLD BASED ON 1.73 SQ M-ARVRAT: 68 ML/MIN (ref 89–?)
HCO3 BLD-SCNC: 23.3 MEQ/L (ref 21–32)
HCT VFR BLD CALC: 36.5 % (ref 39–51)
HEMO FLAGS: (no result)
INR PPP: 0.9 RATIO
LYMPHOCYTES # BLD AUTO: 2.8 TH/MM3 (ref 1–4.8)
LYMPHOCYTES NFR BLD AUTO: 20.6 % (ref 9–44)
MCH RBC QN AUTO: 30.1 PG (ref 27–34)
MCHC RBC AUTO-ENTMCNC: 33.4 % (ref 32–36)
MCV RBC AUTO: 90.1 FL (ref 80–100)
MONOCYTES NFR BLD: 6.4 % (ref 0–8)
NEUTROPHILS # BLD AUTO: 9.6 TH/MM3 (ref 1.8–7.7)
NEUTROPHILS NFR BLD AUTO: 71.3 % (ref 16–70)
PLATELET # BLD: 304 TH/MM3 (ref 150–450)
POTASSIUM SERPL-SCNC: 3.6 MEQ/L (ref 3.5–5.1)
PROTHROMBIN TIME: 10.2 SEC (ref 9.8–11.6)
RBC # BLD AUTO: 4.05 MIL/MM3 (ref 4.5–5.9)
SODIUM SERPL-SCNC: 138 MEQ/L (ref 136–145)
WBC # BLD AUTO: 13.4 TH/MM3 (ref 4–11)

## 2017-06-16 PROCEDURE — 85025 COMPLETE CBC W/AUTO DIFF WBC: CPT

## 2017-06-16 PROCEDURE — 85730 THROMBOPLASTIN TIME PARTIAL: CPT

## 2017-06-16 PROCEDURE — 74177 CT ABD & PELVIS W/CONTRAST: CPT

## 2017-06-16 PROCEDURE — 80053 COMPREHEN METABOLIC PANEL: CPT

## 2017-06-16 PROCEDURE — 85610 PROTHROMBIN TIME: CPT

## 2017-06-16 PROCEDURE — 96374 THER/PROPH/DIAG INJ IV PUSH: CPT

## 2017-06-16 PROCEDURE — 83690 ASSAY OF LIPASE: CPT

## 2017-06-16 PROCEDURE — 99285 EMERGENCY DEPT VISIT HI MDM: CPT

## 2017-06-16 NOTE — PD
Data


Data


Last Documented VS





Vital Signs








  Date Time  Temp Pulse Resp B/P Pulse Ox O2 Delivery O2 Flow Rate FiO2


 


6/16/17 19:38  90 18 172/92 99 Room Air  








Orders





 Complete Blood Count With Diff (6/16/17 19:00)


Comprehensive Metabolic Panel (6/16/17 19:00)


Lipase (6/16/17 19:00)


Prothrombin Time / Inr (Pt) (6/16/17 19:00)


Act Partial Throm Time (Ptt) (6/16/17 19:00)


Iv Access Insert/Monitor (6/16/17 19:00)


Ecg Monitoring (6/16/17 19:00)


Oximetry (6/16/17 19:00)


Sodium Chloride 0.9% Flush (Ns Flush) (6/16/17 19:00)


Ct Abd/Pel W Iv Contrast(Rout) (6/16/17 19:03)


Ondansetron Inj (Zofran Inj) (6/16/17 19:15)


Doxycycline (Vibramycin) (6/16/17 19:15)


Oral Contrast - Adult (6/16/17 19:08)


Diatrizoate Liq (Md Gastroview Liq) (6/16/17 20:48)


Iohexol 350 Inj (Omnipaque 350 Inj) (6/16/17 22:08)


Fleets Enema (Adult) (Fleets Enema (Adul (6/16/17 22:45)





Labs





 Laboratory Tests








Test 6/16/17





 19:25


 


White Blood Count 13.4 TH/MM3


 


Red Blood Count 4.05 MIL/MM3


 


Hemoglobin 12.2 GM/DL


 


Hematocrit 36.5 %


 


Mean Corpuscular Volume 90.1 FL


 


Mean Corpuscular Hemoglobin 30.1 PG


 


Mean Corpuscular Hemoglobin 33.4 %





Concent 


 


Red Cell Distribution Width 16.1 %


 


Platelet Count 304 TH/MM3


 


Mean Platelet Volume 6.5 FL


 


Neutrophils (%) (Auto) 71.3 %


 


Lymphocytes (%) (Auto) 20.6 %


 


Monocytes (%) (Auto) 6.4 %


 


Eosinophils (%) (Auto) 1.1 %


 


Basophils (%) (Auto) 0.6 %


 


Neutrophils # (Auto) 9.6 TH/MM3


 


Lymphocytes # (Auto) 2.8 TH/MM3


 


Monocytes # (Auto) 0.9 TH/MM3


 


Eosinophils # (Auto) 0.1 TH/MM3


 


Basophils # (Auto) 0.1 TH/MM3


 


CBC Comment DIFF FINAL 


 


Differential Comment  


 


Prothrombin Time 10.2 SEC


 


Prothromb Time International 0.9 RATIO





Ratio 


 


Activated Partial 32.9 SEC





Thromboplast Time 


 


Sodium Level 138 MEQ/L


 


Potassium Level 3.6 MEQ/L


 


Chloride Level 104 MEQ/L


 


Carbon Dioxide Level 23.3 MEQ/L


 


Anion Gap 11 MEQ/L


 


Blood Urea Nitrogen 12 MG/DL


 


Creatinine 1.06 MG/DL


 


Estimat Glomerular Filtration 68 ML/MIN





Rate 


 


Random Glucose 121 MG/DL


 


Calcium Level 7.9 MG/DL


 


Total Bilirubin 0.5 MG/DL


 


Aspartate Amino Transf 18 U/L





(AST/SGOT) 


 


Alanine Aminotransferase 7 U/L





(ALT/SGPT) 


 


Alkaline Phosphatase 120 U/L


 


Total Protein 6.1 GM/DL


 


Albumin 1.1 GM/DL


 


Lipase 88 U/L











[facility]


Supervised Visit with ALPESH:  Yes


Narrative Course


I, Dr. Zuleta, have reviewed the advance practice practioner's documentation and 

am in agreement, met with the patient face to face, made the diagnosis, and the 

medical decision making was done by me.  





*My assessment and Findings: 76-year-old male with diffuse crampy abdominal pain

, associated constipation.  Patient describes his pain as a pain more rather in 

the rectum feels "like it's falling out", and less in the abdomen.  His 

abdominal examination benign with mild diffuse tenderness to palpation.  He 

does have a history of AAA status post aortoiliac endograft stent in January 2017 by Dr. Hercules.  A rectal examination performed by PA does show significant 

stool burden, Hemoccult negative.  Laboratory workup unremarkable.  Patient 

unable produced urinalysis.  CT abdomen and pelvis shows endograft with 

endoleak noted within the aneurysm adjacent to the graft.  I spoke with Dr. Hercules, patient's vascular surgeon who felt as though this was likely a benign 

and unrelated to his abdominal pain.  Abdominal pain pain is sounds much more 

likely constipation.  We'll discharge to home with bowel regimen and patient 

will follow up with Dr. Hercules in clinic.


Scripts


No Active Prescriptions or Reported Meds








Jenifer Zuleta MD Jun 16, 2017 22:44

## 2017-06-16 NOTE — RADRPT
EXAM DATE/TIME:  06/16/2017 21:55 

 

[facility] COMPARISON:     

CTA ABDOMEN & PELVIS W 3D RECON, January 21, 2017, 10:59.  CT ABDOMEN & PELVIS W CONTRAST, January 20
, 2017, 13:46.

 

 

INDICATIONS :     

Abdominal pain with constipation.

                      

 

IV CONTRAST:     

100 cc Omnipaque 350 (iohexol) IV 

 

 

ORAL CONTRAST:      

Prescribed oral contrast ingested.

                      

 

RADIATION DOSE:     

6.66 CTDIvol (mGy) 

 

 

MEDICAL HISTORY :     

Aneurysm, abdominal.  

 

SURGICAL HISTORY :      

Appendectomy. Abdominal aortic aneurysm repair.

 

ENCOUNTER:      

Initial

 

ACUITY:      

1 day

 

PAIN SCALE:      

8/10

 

LOCATION:         

abdomen

 

TECHNIQUE:     

Volumetric scanning of the abdomen and pelvis was performed.  Using automated exposure control and ad
justment of the mA and/or kV according to patient size, radiation dose was kept as low as reasonably 
achievable to obtain optimal diagnostic quality images. 

 

FINDINGS:     

 

LOWER LUNGS:     

Small moderate left pleural effusion. Small right pleural effusion. Chronic bilateral lower lung zone
 interstitial opacity.

 

LIVER:     

Homogeneous density without lesion.  There is no dilation of the biliary tree.  No calcified gallston
es.

 

SPLEEN:     

Normal size without lesion.

 

PANCREAS:     

Within normal limits.

 

KIDNEYS:     

Normal in size and shape.  There is no mass, stone or hydronephrosis.

 

ADRENAL GLANDS:     

Within normal limits.

 

VASCULAR:     

Distal abdominal aortic aneurysm is again seen. Aorto- iliac stent graft is in place. Endoleak is not
ed just above the aortic bifurcation.

 

BOWEL/MESENTERY:     

Multiple mildly distended loops of small bowel multiple air-fluid levels. Fluid and scattered stool i
n the colon. Colon is nondilated. No free air or free fluid. Appendix not identified.

 

ABDOMINAL WALL:     

Within normal limits.

 

RETROPERITONEUM:     

There is no lymphadenopathy. 

 

BLADDER:     

No wall thickening or mass. 

 

REPRODUCTIVE:     

Within normal limits.

 

INGUINAL:     

There is no lymphadenopathy or hernia. 

 

MUSCULOSKELETAL:     

Within normal limits for patient age. 

 

CONCLUSION:     

1. Chronic interstitial lung disease and left greater than right pleural effusions.

 

2. Nonspecific mild diffuse small bowel distention. 

3. Abdominal aortic aneurysm with aorto- iliac stent graft in place. Endoleak noted within the aneury
sm adjacent to the graft. 

 

 Jayant Montesinos MD on June 16, 2017 at 22:10           

Board Certified Radiologist.

 This report was verified electronically.

## 2017-06-16 NOTE — PD
HPI


Chief Complaint:  GI Complaint


Time Seen by Provider:  19:02


Travel History


International Travel<30 days:  No


Contact w/Intl Traveler<30days:  No


Traveled to known affect area:  No





History of Present Illness


HPI


Patient comes in complaining of pain throughout his abdomen and not having a 

bowel movement today.  Patient tried using over-the-counter laxative but has 

not had a regular bowel movement and having diarrhea instead.  Patient is 

uncertain if there is any blood in the stool.  Patient describes pain as a 

cramping pain throughout his abdomen.  Pain is worse with palpation.  Patient 

denies any nausea, vomiting, chest pain, shortness breath, or known fevers.  

Patient states he's feels like his rectum is about to fall out.





PFSH


Past Medical History


Cardiovascular Problems:  Yes


Diminished Hearing:  Yes


Immunizations Current:  Yes





Past Surgical History


Appendectomy:  Yes


Eye Surgery:  Yes (cataract)


Other Surgery:  Yes (appendex)





Social History


Alcohol Use:  No


Tobacco Use:  Yes (1 PPD)


Substance Use:  No





Allergies-Medications


(Allergen,Severity, Reaction):  


Coded Allergies:  


     No Known Allergies (Unverified , 6/16/17)


Reported Meds & Prescriptions





Reported Meds & Active Scripts


Active


Fleet Bisacodyl Enema (Bisacodyl) 10 Mg/37 Ml Enem 37 Ml RECTAL DAILY PRN








Review of Systems


Except as stated in HPI:  all other systems reviewed are Neg





Physical Exam


Narrative


GENERAL: Well-developed, well nourished, in mild distress, and non-ill 

appearing.


SKIN: Focused skin assessment warm and dry.  Small tick noted left lateral hip.

  There is no blood filled and the tick was easily removed.


HEAD: Atraumatic. Normocephalic. 


EYES: Pupils equal and round. EOMI. No scleral icterus. No injection or 

drainage. 


ENT: No nasal bleeding or discharge.  Mucous membranes pink and moist.


NECK: Trachea midline. Supple.  No nuclear rigidity.


CARDIOVASCULAR: Regular rate and rhythm.  No murmur appreciated.


RESPIRATORY: No accessory muscle use.  No respiratory distress. Clear to 

auscultation. Breath sounds equal bilaterally. 


GASTROINTESTINAL: Abdomen soft, nondistended. Hepatic and splenic margins not 

palpable.  Slightly hypoactive bowel sounds 4.  No pulsatile mass.  Patient 

reports tenderness to palpation throughout abdomen with voluntary guarding 

noted.


RECTAL EXAM: No masses or tenderness, stool is lime green.  Large amount of 

stool noted in the rectum that is soft.  Was able to disimpact some of this.


MUSCULOSKELETAL: No obvious deformities. No clubbing.  No cyanosis.  No edema.  

Full range of motion.


NEUROLOGICAL: Awake and alert. No obvious cranial nerve deficits.  Motor 

grossly within normal limits. Normal speech.


PSYCHIATRIC: Appropriate mood and affect; insight and judgment normal.





Data


Data


Last Documented VS





Vital Signs








  Date Time  Temp Pulse Resp B/P Pulse Ox O2 Delivery O2 Flow Rate FiO2


 


6/16/17 19:38  90 18 172/92 99 Room Air  








Orders





 Complete Blood Count With Diff (6/16/17 19:00)


Comprehensive Metabolic Panel (6/16/17 19:00)


Lipase (6/16/17 19:00)


Prothrombin Time / Inr (Pt) (6/16/17 19:00)


Act Partial Throm Time (Ptt) (6/16/17 19:00)


Iv Access Insert/Monitor (6/16/17 19:00)


Ecg Monitoring (6/16/17 19:00)


Oximetry (6/16/17 19:00)


Sodium Chloride 0.9% Flush (Ns Flush) (6/16/17 19:00)


Ct Abd/Pel W Iv Contrast(Rout) (6/16/17 19:03)


Ondansetron Inj (Zofran Inj) (6/16/17 19:15)


Doxycycline (Vibramycin) (6/16/17 19:15)


Oral Contrast - Adult (6/16/17 19:08)


Diatrizoate Liq (Md Gastroview Liq) (6/16/17 20:48)


Iohexol 350 Inj (Omnipaque 350 Inj) (6/16/17 22:08)


Fleets Enema (Adult) (Fleets Enema (Adul (6/16/17 22:45)





Labs





 Laboratory Tests








Test 6/16/17





 19:25


 


White Blood Count 13.4 TH/MM3


 


Red Blood Count 4.05 MIL/MM3


 


Hemoglobin 12.2 GM/DL


 


Hematocrit 36.5 %


 


Mean Corpuscular Volume 90.1 FL


 


Mean Corpuscular Hemoglobin 30.1 PG


 


Mean Corpuscular Hemoglobin 33.4 %





Concent 


 


Red Cell Distribution Width 16.1 %


 


Platelet Count 304 TH/MM3


 


Mean Platelet Volume 6.5 FL


 


Neutrophils (%) (Auto) 71.3 %


 


Lymphocytes (%) (Auto) 20.6 %


 


Monocytes (%) (Auto) 6.4 %


 


Eosinophils (%) (Auto) 1.1 %


 


Basophils (%) (Auto) 0.6 %


 


Neutrophils # (Auto) 9.6 TH/MM3


 


Lymphocytes # (Auto) 2.8 TH/MM3


 


Monocytes # (Auto) 0.9 TH/MM3


 


Eosinophils # (Auto) 0.1 TH/MM3


 


Basophils # (Auto) 0.1 TH/MM3


 


CBC Comment DIFF FINAL 


 


Differential Comment  


 


Prothrombin Time 10.2 SEC


 


Prothromb Time International 0.9 RATIO





Ratio 


 


Activated Partial 32.9 SEC





Thromboplast Time 


 


Sodium Level 138 MEQ/L


 


Potassium Level 3.6 MEQ/L


 


Chloride Level 104 MEQ/L


 


Carbon Dioxide Level 23.3 MEQ/L


 


Anion Gap 11 MEQ/L


 


Blood Urea Nitrogen 12 MG/DL


 


Creatinine 1.06 MG/DL


 


Estimat Glomerular Filtration 68 ML/MIN





Rate 


 


Random Glucose 121 MG/DL


 


Calcium Level 7.9 MG/DL


 


Total Bilirubin 0.5 MG/DL


 


Aspartate Amino Transf 18 U/L





(AST/SGOT) 


 


Alanine Aminotransferase 7 U/L





(ALT/SGPT) 


 


Alkaline Phosphatase 120 U/L


 


Total Protein 6.1 GM/DL


 


Albumin 1.1 GM/DL


 


Lipase 88 U/L











[facility]


Medical Decision Making


Medical Screen Exam Complete:  Yes


Emergency Medical Condition:  Yes


Interpretation(s)


CT abdomen and pelvis read by radiology shows:





1. Chronic interstitial lung disease and left greater than right pleural 

effusions.


2. Nonspecific mild diffuse small bowel distention.


3. Abdominal aortic aneurysm with aorto- iliac stent graft in place. Endoleak 

noted within the aneurysm adjacent to the graft.


Differential Diagnosis


Constipation, obstruction, dehydration, tick bite, electrolyte abnormality, 

other





HemaPrompt Point of Care


Internal Pos. & Neg. Controls:  Passed


Fecal Specimen Occult Blood:  Negative


Comment


Verbal consent was obtained.  Digital rectal exam was performed.  Patient was 

disimpacted some.  Stool specimen applied and test interpreted between 1 and 3 

minutes of application and the result was negative.


Internal Controls: Both positive and negative controls were validated.  Staff 

RN was present during this exam.





Diagnosis





 Primary Impression:  


 Abdominal pain


 Qualified Code:  R10.9 - Abdominal pain, unspecified location


 Additional Impressions:  


 Constipation


 Qualified Code:  K59.00 - Constipation, unspecified constipation type


 Endoleak post (EVAR) endovascular aneurysm repair


 Qualified Code:  T82.330A - Endoleak post (EVAR) endovascular aneurysm repair, 

initial encounter


Referrals:  


Saqib Hercules MD


Patient Instructions:  Abdominal Pain (ED), Constipation (ED), General 

Instructions





***Additional Instructions:


Follow-up with your primary care physician and vascular surgeon this week for 

reevaluation.  Take all medication as prescribed.  Use over-the-counter 

laxatives as needed for bowel movements.  Follow instructions on the packaging.

  Discontinue if diarrhea occurs.  Return to the emergency department if 

symptoms get worse.


***Med/Other Pt SpecificInfo:  Prescription(s) given


Scripts


Bisacodyl Enema (Fleet Bisacodyl Enema)10 Mg/37 Ml Enem37 Ml RECTAL DAILY PRN (

CONSTIPATION) #1 BOTTLE  Ref 0


   Prov:Jenifer Zuleta MD         6/16/17


Disposition:  01 DISCHARGE HOME


Condition:  Stable








Tyler Quiros Jun 16, 2017 19:02

## 2017-06-17 VITALS
DIASTOLIC BLOOD PRESSURE: 82 MMHG | RESPIRATION RATE: 18 BRPM | OXYGEN SATURATION: 100 % | SYSTOLIC BLOOD PRESSURE: 168 MMHG | HEART RATE: 88 BPM

## 2018-02-24 ENCOUNTER — HOSPITAL ENCOUNTER (EMERGENCY)
Dept: HOSPITAL 17 - NEPC | Age: 78
Discharge: HOME | End: 2018-02-24
Payer: MEDICARE

## 2018-02-24 VITALS
OXYGEN SATURATION: 98 % | TEMPERATURE: 97.8 F | RESPIRATION RATE: 18 BRPM | DIASTOLIC BLOOD PRESSURE: 85 MMHG | HEART RATE: 96 BPM | SYSTOLIC BLOOD PRESSURE: 172 MMHG

## 2018-02-24 VITALS — WEIGHT: 149.91 LBS | HEIGHT: 72 IN | BODY MASS INDEX: 20.31 KG/M2

## 2018-02-24 DIAGNOSIS — K59.00: Primary | ICD-10-CM

## 2018-02-24 DIAGNOSIS — F17.200: ICD-10-CM

## 2018-02-24 PROCEDURE — 99284 EMERGENCY DEPT VISIT MOD MDM: CPT

## 2018-02-24 NOTE — PD
HPI


Chief Complaint:  GI Complaint


Time Seen by Provider:  21:07


Travel History


International Travel<30 days:  No


Contact w/Intl Traveler<30days:  No


Traveled to known affect area:  No





History of Present Illness


HPI


77-year-old male patient with previous history of constipation, presents to the 

ER today because he states that he had taken Ex-Lax last night, and feels a lot 

of discomfort and feels like there is a ball that is trying to come out.  He 

has been having intermittent abdominal cramping.  He denies any fevers, vomiting

, or any other symptoms.





Modifying Factors: None


Associated Signs & Symptoms: Constipation, feels that there is a ball stuck on 

his bottom


Risk Factors: History of constipation





PFSH


Past Medical History


AAA:  Yes


Cardiovascular Problems:  Yes


Diminished Hearing:  Yes


Immunizations Current:  Yes





Past Surgical History


Appendectomy:  Yes


Eye Surgery:  Yes (cataract)


Other Surgery:  Yes (appendex)





Social History


Alcohol Use:  No


Tobacco Use:  Yes (1 PPD)


Substance Use:  No





Allergies-Medications


(Allergen,Severity, Reaction):  


Coded Allergies:  


     No Known Allergies (Unverified  Adverse Reaction, Unknown, 2/24/18)


Reported Meds & Prescriptions





Reported Meds & Active Scripts


Active


Fleet Bisacodyl Enema (Bisacodyl) 10 Mg/37 Ml Enem 37 Ml RECTAL DAILY PRN








Review of Systems


Except as stated in HPI:  all other systems reviewed are Neg





Physical Exam


Narrative


GENERAL: Well-developed elderly male patient currently in mild distress.  Awake 

and oriented 3.


SKIN: Focused skin assessment warm/dry.


HEAD: Atraumatic. Normocephalic. 


EYES: Pupils equal and round. No scleral icterus. No injection or drainage. 


ENT: No nasal bleeding or discharge.  Mucous membranes pink and moist.


NECK: Trachea midline. No JVD. 


CARDIOVASCULAR: Regular rate and rhythm.  No murmur appreciated.


RESPIRATORY: No accessory muscle use. Clear to auscultation. Breath sounds 

equal bilaterally. 


GASTROINTESTINAL: Abdomen soft, non-tender, nondistended. Hepatic and splenic 

margins not palpable. 


RECTAL EXAM: No masses or tenderness, there is a large stool bolus within the 

rectum, there is also brownish liquid stool.


MUSCULOSKELETAL: No obvious deformities. No clubbing.  No cyanosis.  No edema. 


NEUROLOGICAL: Awake and alert. No obvious cranial nerve deficits.  Motor 

grossly within normal limits. Normal speech.


PSYCHIATRIC: Appropriate mood and affect; insight and judgment normal.





Data


Data


Last Documented VS





Vital Signs








  Date Time  Temp Pulse Resp B/P (MAP) Pulse Ox O2 Delivery O2 Flow Rate FiO2


 


2/24/18 20:34 97.8 96 18 172/85 (114) 98 Room Air  











MDM


Medical Decision Making


Medical Screen Exam Complete:  Yes


Emergency Medical Condition:  Yes


Medical Record Reviewed:  Yes


Differential Diagnosis


Stool impaction versus hemorrhoids versus rectal prolapse


Narrative Course


I have attempted to disimpact him.  He did not have significant hemorrhoids or 

rectal prolapse causing issues currently.  It appears that he is fairly 

constipated.  At this point, my plan would be to release him with further 

treatment for constipation.  Return for new issues as needed.  The plan has 

been discussed with him and he states understanding.





Procedures


**Procedure Narrative**


Rectal exam was done, and large stool bolus was digitally removed by me without 

issues.





Diagnosis





 Primary Impression:  


 Constipation


***Med/Other Pt SpecificInfo:  Prescription(s) given


Scripts


Bisacodyl Enema (Fleet Bisacodyl Enema) 10 Mg/37 Ml Enem


37 ML RECTAL DAILY Y for CONSTIPATION, #2 BOTTLE 0 Refills


   Prov: Hussein Melgar MD         2/24/18 


Docusate Sodium (Colace) 100 Mg Capsule


100 MG PO BID for Prevent Constipation, #20 CAP 0 Refills


   Prov: Hussein Melgar MD         2/24/18


Disposition:  01 DISCHARGE HOME


Condition:  Stable











Hussein Melgar MD Feb 24, 2018 21:24

## 2018-03-22 ENCOUNTER — HOSPITAL ENCOUNTER (EMERGENCY)
Dept: HOSPITAL 17 - NEPC | Age: 78
Discharge: HOME | End: 2018-03-22
Payer: MEDICARE

## 2018-03-22 VITALS — BODY MASS INDEX: 21.72 KG/M2 | HEIGHT: 68 IN | WEIGHT: 143.3 LBS

## 2018-03-22 VITALS
RESPIRATION RATE: 18 BRPM | TEMPERATURE: 98 F | SYSTOLIC BLOOD PRESSURE: 193 MMHG | HEART RATE: 72 BPM | DIASTOLIC BLOOD PRESSURE: 86 MMHG

## 2018-03-22 VITALS — RESPIRATION RATE: 16 BRPM | SYSTOLIC BLOOD PRESSURE: 178 MMHG | DIASTOLIC BLOOD PRESSURE: 84 MMHG | HEART RATE: 66 BPM

## 2018-03-22 DIAGNOSIS — T82.330A: Primary | ICD-10-CM

## 2018-03-22 DIAGNOSIS — F17.200: ICD-10-CM

## 2018-03-22 DIAGNOSIS — K59.00: ICD-10-CM

## 2018-03-22 LAB
ALBUMIN SERPL-MCNC: 2.4 GM/DL (ref 3.4–5)
ALP SERPL-CCNC: 98 U/L (ref 45–117)
ALT SERPL-CCNC: 11 U/L (ref 12–78)
AST SERPL-CCNC: 16 U/L (ref 15–37)
BASOPHILS # BLD AUTO: 0.1 TH/MM3 (ref 0–0.2)
BASOPHILS NFR BLD: 0.6 % (ref 0–2)
BILIRUB SERPL-MCNC: 0.5 MG/DL (ref 0.2–1)
BUN SERPL-MCNC: 18 MG/DL (ref 7–18)
CALCIUM SERPL-MCNC: 8.5 MG/DL (ref 8.5–10.1)
CHLORIDE SERPL-SCNC: 107 MEQ/L (ref 98–107)
COLOR UR: YELLOW
CREAT SERPL-MCNC: 1.15 MG/DL (ref 0.6–1.3)
EOSINOPHIL # BLD: 0.3 TH/MM3 (ref 0–0.4)
EOSINOPHIL NFR BLD: 2.6 % (ref 0–4)
ERYTHROCYTE [DISTWIDTH] IN BLOOD BY AUTOMATED COUNT: 15 % (ref 11.6–17.2)
GFR SERPLBLD BASED ON 1.73 SQ M-ARVRAT: 62 ML/MIN (ref 89–?)
GLUCOSE SERPL-MCNC: 85 MG/DL (ref 74–106)
GLUCOSE UR STRIP-MCNC: (no result) MG/DL
HCO3 BLD-SCNC: 23.2 MEQ/L (ref 21–32)
HCT VFR BLD CALC: 34.2 % (ref 39–51)
HGB BLD-MCNC: 11.5 GM/DL (ref 13–17)
HGB UR QL STRIP: (no result)
HYALINE CASTS #/AREA URNS LPF: 3 /LPF
KETONES UR STRIP-MCNC: (no result) MG/DL
LYMPHOCYTES # BLD AUTO: 2.2 TH/MM3 (ref 1–4.8)
LYMPHOCYTES NFR BLD AUTO: 21.1 % (ref 9–44)
MCH RBC QN AUTO: 29.8 PG (ref 27–34)
MCHC RBC AUTO-ENTMCNC: 33.7 % (ref 32–36)
MCV RBC AUTO: 88.4 FL (ref 80–100)
MONOCYTE #: 0.8 TH/MM3 (ref 0–0.9)
MONOCYTES NFR BLD: 7.4 % (ref 0–8)
MUCOUS THREADS #/AREA URNS LPF: (no result) /LPF
NEUTROPHILS # BLD AUTO: 7 TH/MM3 (ref 1.8–7.7)
NEUTROPHILS NFR BLD AUTO: 68.3 % (ref 16–70)
NITRITE UR QL STRIP: (no result)
PLATELET # BLD: 303 TH/MM3 (ref 150–450)
PMV BLD AUTO: 6.3 FL (ref 7–11)
PROT SERPL-MCNC: 7.9 GM/DL (ref 6.4–8.2)
RBC # BLD AUTO: 3.87 MIL/MM3 (ref 4.5–5.9)
SODIUM SERPL-SCNC: 138 MEQ/L (ref 136–145)
SP GR UR STRIP: 1.02 (ref 1–1.03)
URINE LEUKOCYTE ESTERASE: (no result)
WBC # BLD AUTO: 10.2 TH/MM3 (ref 4–11)

## 2018-03-22 PROCEDURE — 81001 URINALYSIS AUTO W/SCOPE: CPT

## 2018-03-22 PROCEDURE — 80053 COMPREHEN METABOLIC PANEL: CPT

## 2018-03-22 PROCEDURE — 51702 INSERT TEMP BLADDER CATH: CPT

## 2018-03-22 PROCEDURE — 99284 EMERGENCY DEPT VISIT MOD MDM: CPT

## 2018-03-22 PROCEDURE — 74177 CT ABD & PELVIS W/CONTRAST: CPT

## 2018-03-22 PROCEDURE — 85025 COMPLETE CBC W/AUTO DIFF WBC: CPT

## 2018-03-22 NOTE — PD
HPI


Chief Complaint:  Abdominal Pain


Time Seen by Provider:  14:41


Travel History


International Travel<30 days:  No


Contact w/Intl Traveler<30days:  No


Traveled to known affect area:  No





History of Present Illness


HPI


Patient is a 77-year-old male who comes in complaining of abdominal pain.  Says 

the pain is in his lower abdomen and started early this morning.  He says he 

has not urinated since yesterday.  He also reports having problems having a 

bowel movement.  He denies nausea or vomiting.  He denies fever chills.  He 

denies issues with his prostate in the past.  Severity is moderate.





PFS


Past Medical History


AAA:  Yes (STENT PLACED )


Cardiovascular Problems:  Yes (MURMUR)


Diminished Hearing:  Yes


Hypertension:  Yes


Immunizations Current:  Yes


Influenza Vaccination:  No





Past Surgical History


Appendectomy:  Yes


Eye Surgery:  Yes (cataract)


Other Surgery:  Yes (appendex)





Social History


Alcohol Use:  No


Tobacco Use:  Yes (1 PPD)


Substance Use:  No





Allergies-Medications


(Allergen,Severity, Reaction):  


Coded Allergies:  


     No Known Allergies (Unverified  Adverse Reaction, Unknown, 2/24/18)


Reported Meds & Prescriptions





Reported Meds & Active Scripts


Active


Fleet Bisacodyl Enema (Bisacodyl) 10 Mg/37 Ml Enem 37 Ml RECTAL DAILY PRN


Colace (Docusate Sodium) 100 Mg Capsule 100 Mg PO BID


Fleet Bisacodyl Enema (Bisacodyl) 10 Mg/37 Ml Enem 37 Ml RECTAL DAILY PRN








Review of Systems


Except as stated in HPI:  all other systems reviewed are Neg


General / Constitutional:  No: Fever, Chills


HENT:  No: Headaches, Lightheadedness


Cardiovascular:  No: Chest Pain or Discomfort


Respiratory:  No: Shortness of Breath


Gastrointestinal:  Positive: Abdominal Pain, Constipation


Musculoskeletal:  No: Myalgias, Edema


Skin:  No Change in Pigmentation





Physical Exam


Narrative


GENERAL: Awake and alert, in mild distress due to pain.


SKIN: Focused skin assessment warm/dry.


HEAD: Atraumatic. Normocephalic. 


EYES: Pupils equal and round. No scleral icterus. 


ENT: Mucous membranes pink and moist.


NECK: Trachea midline. No JVD. 


CARDIOVASCULAR: Regular rate and rhythm.  No murmur appreciated.


RESPIRATORY: No accessory muscle use. Clear to auscultation. Breath sounds 

equal bilaterally. 


GASTROINTESTINAL: Abdomen soft, nondistended. Distended bladder, tender to 

palpation. 


MUSCULOSKELETAL: No obvious deformities. No clubbing.  No cyanosis.  No edema. 


NEUROLOGICAL: Awake and alert. No obvious cranial nerve deficits.  Motor 

grossly within normal limits. Normal speech.


PSYCHIATRIC: Appropriate mood and affect; insight and judgment normal.





Data


Data


Last Documented VS





Vital Signs








  Date Time  Temp Pulse Resp B/P (MAP) Pulse Ox O2 Delivery O2 Flow Rate FiO2


 


3/22/18 14:57 98.0 72 18 193/86 (121)    








Orders





 Orders


Iv Access Insert/Monitor (3/22/18 14:44)


Complete Blood Count With Diff (3/22/18 14:44)


Comprehensive Metabolic Panel (3/22/18 14:44)


Urinalysis - C+S If Indicated (3/22/18 14:44)


Urinary Catheter Management [email or web address] (3/22/18 14:44)


Ct Abd/Pel W Iv Contrast(Rout) (3/22/18 )


Iohexol 350 Inj (Omnipaque 350 Inj) (3/22/18 14:31)


Fleets Enema (Adult) (Fleets Enema (Adul (3/22/18 18:15)





Labs





Laboratory Tests








Test


  3/22/18


14:55


 


White Blood Count 10.2 TH/MM3 


 


Red Blood Count 3.87 MIL/MM3 


 


Hemoglobin 11.5 GM/DL 


 


Hematocrit 34.2 % 


 


Mean Corpuscular Volume 88.4 FL 


 


Mean Corpuscular Hemoglobin 29.8 PG 


 


Mean Corpuscular Hemoglobin


Concent 33.7 % 


 


 


Red Cell Distribution Width 15.0 % 


 


Platelet Count 303 TH/MM3 


 


Mean Platelet Volume 6.3 FL 


 


Neutrophils (%) (Auto) 68.3 % 


 


Lymphocytes (%) (Auto) 21.1 % 


 


Monocytes (%) (Auto) 7.4 % 


 


Eosinophils (%) (Auto) 2.6 % 


 


Basophils (%) (Auto) 0.6 % 


 


Neutrophils # (Auto) 7.0 TH/MM3 


 


Lymphocytes # (Auto) 2.2 TH/MM3 


 


Monocytes # (Auto) 0.8 TH/MM3 


 


Eosinophils # (Auto) 0.3 TH/MM3 


 


Basophils # (Auto) 0.1 TH/MM3 


 


CBC Comment DIFF FINAL 


 


Differential Comment  


 


Urine Color YELLOW 


 


Urine Turbidity CLEAR 


 


Urine pH 6.0 


 


Urine Specific Gravity 1.019 


 


Urine Protein 300 mg/dL 


 


Urine Glucose (UA) NEG mg/dL 


 


Urine Ketones NEG mg/dL 


 


Urine Occult Blood SMALL 


 


Urine Nitrite NEG 


 


Urine Bilirubin NEG 


 


Urine Urobilinogen


  LESS THAN 2.0


MG/DL


 


Urine Leukocyte Esterase NEG 


 


Urine RBC 28 /hpf 


 


Urine Hyaline Casts 3 /lpf 


 


Urine Mucus FEW /lpf 


 


Microscopic Urinalysis Comment


  CATH-CULT NOT


IND


 


Blood Urea Nitrogen 18 MG/DL 


 


Creatinine 1.15 MG/DL 


 


Random Glucose 85 MG/DL 


 


Total Protein 7.9 GM/DL 


 


Albumin 2.4 GM/DL 


 


Calcium Level 8.5 MG/DL 


 


Alkaline Phosphatase 98 U/L 


 


Aspartate Amino Transf


(AST/SGOT) 16 U/L 


 


 


Alanine Aminotransferase


(ALT/SGPT) 11 U/L 


 


 


Total Bilirubin 0.5 MG/DL 


 


Sodium Level 138 MEQ/L 


 


Potassium Level 4.0 MEQ/L 


 


Chloride Level 107 MEQ/L 


 


Carbon Dioxide Level 23.2 MEQ/L 


 


Anion Gap 8 MEQ/L 


 


Estimat Glomerular Filtration


Rate 62 ML/MIN 


 











MDM


Medical Decision Making


Medical Screen Exam Complete:  Yes


Emergency Medical Condition:  Yes


Medical Record Reviewed:  Yes


Differential Diagnosis


Urinary obstruction versus constipation versus colitis versus small bowel 

obstruction


Narrative Course


Patient is a 77-year-old male who comes in complaining of lower abdominal pain.

  Exam shows a tender, distended bladder.  IV established, labs sent.  Labs 

show no acute abnormalities.





Argueta catheter placed with 1200 mL's of urine immediately put out.  Patient 

felt relief of his pain.





CT abdomen and pelvis performed shows an endoleak of his abdominal aortic 

aneurysm.


CONCLUSION:     


1. Evidence of chronic endoleak and slight interval enlargement of the lumen of 

the stented abdominal aortic aneurysm which now measures 5.9 cm AP by 6.9 cm 

transverse. 


2. Probable fecal impaction. 


3. Degenerative changes and scoliosis of the lumbar spine. 


4. Chronic interstitial fibrosis of the visualized lung bases. 


5. Tiny right pleural effusion.





I spoke with Dr. Hercules who performed surgery on his aneurysm a year ago.  He 

looked at the CAT scan and says that this is a type II leak and he can follow-

up in the office.  He said that his office will call the patient next week to 

set up an appointment.





Patient disimpacted, soft stool removed.  Given a Fleet enema.





Patient to be discharged with prescriptions for Flomax, Cipro, Miralax.  

Advised to keep the argueta in until he sees urology.  Advised to return at any 

time for any worsening symptoms.





Diagnosis





 Primary Impression:  


 Endoleak post (EVAR) endovascular aneurysm repair


 Qualified Codes:  T82.330A - Leakage of aortic (bifurcation) graft (replacement

), initial encounter


 Additional Impressions:  


 Constipation


 Qualified Codes:  K59.00 - Constipation, unspecified


 Urinary obstruction


Referrals:  


Saqib Hercules MD


call for appointment





Ambrosio Arellano DO


call for appointment


Patient Instructions:  Constipation (ED), General Instructions, Urinary 

Retention in Men (ED)





***Additional Instructions:  


Leave the Argueta in place until you see the urologist.  Take all of the 

antibiotic.  Increase your fluid intake.  Follow up with Dr. Hercules regarding 

your aneurysm.  Return at any time for any worsening symptoms.


Scripts


Polyethylene Glycol 3350 Powder (Miralax Powder) 17 Gm Powd


17 GM PO DAILY for Constipation, #1 CAN 0 Refills


   Mix and dissolve one measuring cap-ful (17 grams) in water or juice.


   Prov: Chante Grayson MD         3/22/18 


Tamsulosin (Flomax) 0.4 Mg Cap


0.4 MG PO HS for Manage Prostate Problems, #30 CAP 0 Refills


   Prov: Chante Grayson MD         3/22/18 


Ciprofloxacin (Cipro) 500 Mg Tab


500 MG PO BID for Infection for 5 Days, #10 TAB 0 Refills


   Prov: Chante Grayson MD         3/22/18


Disposition:  01 DISCHARGE HOME


Condition:  Stable











Chante Grayson MD Mar 22, 2018 18:03